# Patient Record
Sex: FEMALE | Race: BLACK OR AFRICAN AMERICAN | NOT HISPANIC OR LATINO | Employment: UNEMPLOYED | URBAN - METROPOLITAN AREA
[De-identification: names, ages, dates, MRNs, and addresses within clinical notes are randomized per-mention and may not be internally consistent; named-entity substitution may affect disease eponyms.]

---

## 2017-02-02 ENCOUNTER — APPOINTMENT (OUTPATIENT)
Dept: GENERAL RADIOLOGY | Facility: CLINIC | Age: 57
End: 2017-02-02
Attending: EMERGENCY MEDICINE
Payer: COMMERCIAL

## 2017-02-02 ENCOUNTER — HOSPITAL ENCOUNTER (EMERGENCY)
Facility: CLINIC | Age: 57
Discharge: HOME | End: 2017-02-02
Attending: EMERGENCY MEDICINE | Admitting: EMERGENCY MEDICINE
Payer: COMMERCIAL

## 2017-02-02 VITALS
WEIGHT: 275 LBS | HEIGHT: 70 IN | TEMPERATURE: 97.4 F | SYSTOLIC BLOOD PRESSURE: 118 MMHG | OXYGEN SATURATION: 99 % | BODY MASS INDEX: 39.37 KG/M2 | DIASTOLIC BLOOD PRESSURE: 67 MMHG | RESPIRATION RATE: 16 BRPM | HEART RATE: 60 BPM

## 2017-02-02 DIAGNOSIS — S22.32XA CLOSED FRACTURE OF ONE RIB OF LEFT SIDE, INITIAL ENCOUNTER: ICD-10-CM

## 2017-02-02 PROCEDURE — 68200002 ZZH TRAUMA EVALUATION W/O CC LEVEL II: Performed by: EMERGENCY MEDICINE

## 2017-02-02 PROCEDURE — 99284 EMERGENCY DEPT VISIT MOD MDM: CPT | Mod: 25 | Performed by: EMERGENCY MEDICINE

## 2017-02-02 PROCEDURE — 76705 ECHO EXAM OF ABDOMEN: CPT | Performed by: EMERGENCY MEDICINE

## 2017-02-02 PROCEDURE — 25000132 ZZH RX MED GY IP 250 OP 250 PS 637: Performed by: EMERGENCY MEDICINE

## 2017-02-02 PROCEDURE — 76705 ECHO EXAM OF ABDOMEN: CPT | Mod: 26 | Performed by: EMERGENCY MEDICINE

## 2017-02-02 PROCEDURE — 71101 X-RAY EXAM UNILAT RIBS/CHEST: CPT | Mod: LT

## 2017-02-02 RX ORDER — ACETAMINOPHEN 500 MG
1000 TABLET ORAL ONCE
Status: COMPLETED | OUTPATIENT
Start: 2017-02-02 | End: 2017-02-02

## 2017-02-02 RX ORDER — HYDROCODONE BITARTRATE AND ACETAMINOPHEN 5; 325 MG/1; MG/1
1-2 TABLET ORAL EVERY 4 HOURS PRN
Qty: 15 TABLET | Refills: 0 | Status: SHIPPED | OUTPATIENT
Start: 2017-02-02 | End: 2022-06-28 | Stop reason: SINTOL

## 2017-02-02 RX ADMIN — ACETAMINOPHEN 1000 MG: 500 TABLET ORAL at 07:37

## 2017-02-02 NOTE — ED PROVIDER NOTES
"  History     Chief Complaint   Patient presents with     Rib Pain     Pt fell in shower     HPI  Alessandra Flynn is a 57 year old female who presents with a fall. 8pm last night she slipped in the shower, landing on the left anterolateral chest. Has discomfort with deep breathing. No blood in urine. No head, neck or back pain. No extremity injuries.   Pt is not anticoagulated.     I have reviewed the Medications, Allergies, Past Medical and Surgical History, and Social History in the [employer] system.  History reviewed. No pertinent past medical history.    Past Surgical History   Procedure Laterality Date     Ankle surgery       Remove hardware ankle Left 11/9/2016     Procedure: REMOVE HARDWARE ANKLE;  Surgeon: Zack Hardy MD;  Location:  OR       No family history on file.    Social History   Substance Use Topics     Smoking status: Never Smoker      Smokeless tobacco: Not on file     Alcohol Use: No       Review of Systems   All systems were reviewed and are negative.       Physical Exam   BP: 128/72 mmHg  Pulse: 60  Temp: 97.4  F (36.3  C)  Resp: 16  Height: 177.8 cm (5' 10\")  Weight: 124.739 kg (275 lb)  SpO2: 99 %    Physical Exam  Gen:A&Ox3, no acute distress  HEENT:PERRL, no facial tenderness or wounds, head atraumatic  Neck:no bony tenderness and full ROM  Back: no CVA tenderness, no midline bony tenderness  CV:RRR without murmurs  PULM:Clear to auscultation bilaterally, tenderness under left breast and lateral lower ribs. No crepitus, no fail movement. No ecchymosis.   Abd:soft, nontender, nondistended. Bowel sounds present and normal  UE:No traumatic injuries, skin normal  LE:no traumatic injuries, skin normal, no LE edema.   Neuro:CN II-XII intact, strength 5/5 throughout  Skin: no rashes or ecchymoses      ED Course     Procedures  Results for orders placed during the hospital encounter of 02/02/17   POC US ABDOMEN LIMITED    Impression Limited Bedside Abdominal Ultrasound, performed and " interpreted  by me.     Indication: left upper adomen, low chest pain. Evaluate for Free  fluid.     The RUQ, LUQ, (including the paracolic gutters) and suprapubic  views were examined for free fluid.    Findings: There was no evidence of free fluid below bilateral  diaphragms, in the splenorenal or hepatorenal space, or in  bilateral paracolic gutters. There was no free fluid around the  urinary bladder.    IMPRESSION: No evidence of abdominal free fluid.             Critical Care time:  none  Trauma:  Level of trauma activation: ED evaluaton  C-collar and immobilization: not indicated, cleared.  CSpine Clearance: by Nexus Criteria  GCS at arrival: 15  GCS at disposition: unchanged  Full Primary and Secondary survey with appropriate immobilization of spine completed in exam section.  Consults prior to admission or transfer: none  Procedures done in the ED: none         Assessments & Plan (with Medical Decision Making)   58 yo F presenting after a slip in fall in shower. Left chest pain.   Vitals stable.   POCUS of the abdomen without free fluid to suggest solid organ injury. Low clinical suspicion for hollow viscus injury.   CXR with left ribs showing possible fracture of anterior 7th rib. This does correlate with pt's area of tenderness.   Will prescribe a small amount of vicodin PRN for pain. Pt given home care information.   Follow up with primary care.     Treated with tylenol for pain x1 in the ED.       I have reviewed the nursing notes.    I have reviewed the findings, diagnosis, plan and need for follow up with the patient.    Discharge Medication List as of 2/2/2017  8:44 AM      START taking these medications    Details   HYDROcodone-acetaminophen (NORCO) 5-325 MG per tablet Take 1-2 tablets by mouth every 4 hours as needed for moderate to severe pain, Disp-15 tablet, R-0, Local Print             Final diagnoses:   Closed fracture of one rib of left side, initial encounter       2/2/2017   [facility], [place],  EMERGENCY DEPARTMENT  MD BARBIE [place] Katrina Anne, MD  02/02/17 0981

## 2017-02-02 NOTE — ED NOTES
Pt was taking a shower around 2000 on 2/1/17, Pt slipped in bathtub and fell on L side of rib cage.

## 2017-02-02 NOTE — ED AVS SNAPSHOT
[facility], Emergency Department    [address]    [facility] [place] [place] [place] [address]    Phone:  [phone]    Fax:  [phone]                                       Alessandra Flynn   MRN: [id number]    Department:  [facility], Emergency Department   Date of Visit:  2/2/2017           Patient Information     Date Of Birth          1960        Your diagnoses for this visit were:     Closed fracture of one rib of left side, initial encounter        You were seen by Yi Shahid MD.      24 Hour Appointment Hotline       To make an appointment at any [facility] clinic, call [phone]). If you don't have a family doctor or clinic, we will help you find one. [facility] clinics are conveniently located to serve the needs of you and your family.             Review of your medicines      START taking        Dose / Directions Last dose taken    HYDROcodone-acetaminophen 5-325 MG per tablet   Commonly known as:  NORCO   Dose:  1-2 tablet   Quantity:  15 tablet        Take 1-2 tablets by mouth every 4 hours as needed for moderate to severe pain   Refills:  0          Our records show that you are taking the medicines listed below. If these are incorrect, please call your family doctor or clinic.        Dose / Directions Last dose taken    ascorbic acid 1000 MG Tabs   Commonly known as:  vitamin C   Dose:  1000 mg        Take 1,000 mg by mouth daily.   Refills:  0        celeXA 20 MG tablet   Dose:  20 mg   Generic drug:  citalopram        Take 20 mg by mouth daily.   Refills:  0        docusate sodium 100 MG capsule   Commonly known as:  COLACE   Dose:  1 capsule        Take 1 capsule by mouth 2 times daily.   Refills:  0        FLUTICASONE PROPIONATE        Refills:  0        hydrOXYzine 25 MG tablet   Commonly known as:  ATARAX   Dose:  25 mg   Quantity:  30 tablet        Take 1 tablet (25 mg) by mouth every 6 hours as needed for itching (and nausea)   Refills:  0        loratadine 10 MG tablet    Commonly known as:  CLARITIN   Dose:  10 mg        Take 10 mg by mouth daily.   Refills:  0        * MAPAP 500 MG Caps   Dose:  1-2 tablet   Generic drug:  acetaminophen        Take 1-2 tablets by mouth as needed.   Refills:  0        * acetaminophen 325 MG tablet   Commonly known as:  TYLENOL   Dose:  650 mg   Quantity:  100 tablet        Take 2 tablets (650 mg) by mouth every 4 hours as needed for other (mild pain)   Refills:  0        omeprazole 20 MG CR capsule   Commonly known as:  priLOSEC   Dose:  1 capsule        Take 1 capsule by mouth 2 times daily.   Refills:  0        ondansetron 4 MG ODT tab   Commonly known as:  ZOFRAN-ODT   Dose:  4-8 mg   Quantity:  4 tablet        Take 1-2 tablets (4-8 mg) by mouth every 8 hours as needed for nausea Dissolve ON the tongue.   Refills:  0        senna-docusate 8.6-50 MG per tablet   Commonly known as:  SENOKOT-S;PERICOLACE   Dose:  1-2 tablet   Quantity:  30 tablet        Take 1-2 tablets by mouth 2 times daily Take while on oral narcotics to prevent or treat constipation.   Refills:  0        simvastatin 40 MG tablet   Commonly known as:  ZOCOR        Take  by mouth At Bedtime.   Refills:  0        * Notice:  This list has 2 medication(s) that are the same as other medications prescribed for you. Read the directions carefully, and ask your doctor or other care provider to review them with you.            Prescriptions were sent or printed at these locations (1 Prescription)                   Other Prescriptions                Printed at Department/Unit printer (1 of 1)         HYDROcodone-acetaminophen (NORCO) 5-325 MG per tablet                Procedures and tests performed during your visit     POC US ABDOMEN LIMITED    Ribs XR, unilat 3 views + PA chest,  left      Orders Needing Specimen Collection     None      Pending Results     Date and Time Order Name Status Description    2/2/2017 0709 POC US ABDOMEN LIMITED In process     2/2/2017 0658 Ribs XR, unilat 3  "views + PA chest,  left Preliminary             Pending Culture Results     No orders found from 2017 to 2/3/2017.            Thank you for choosing [facility]       Thank you for choosing [facility] for your care. Our goal is always to provide you with excellent care. Hearing back from our patients is one way we can continue to improve our services. Please take a few minutes to complete the written survey that you may receive in the mail after you visit with us. Thank you!        [employer] Information     [facility] lets you send messages to your doctor, view your test results, renew your prescriptions, schedule appointments and more. To sign up, go to [email or web address] . Click on \"Log in\" on the left side of the screen, which will take you to the Welcome page. Then click on \"Sign up Now\" on the right side of the page.     You will be asked to enter the access code listed below, as well as some personal information. Please follow the directions to create your username and password.     Your access code is: [email or web address]  Expires: 3/19/2017  6:30 AM     Your access code will  in 90 days. If you need help or a new code, please call your [facility] clinic or [phone].        Care EveryWhere ID     This is your Care EveryWhere ID. This could be used by other organizations to access your [facility] medical records  [id number]        After Visit Summary       This is your record. Keep this with you and show to your community pharmacist(s) and doctor(s) at your next visit.                  "

## 2017-02-02 NOTE — ED AVS SNAPSHOT
[facility], [place], Emergency Department    [address]    [facility] [address]    Phone:  [phone]    Fax:  [phone]                                       Alessandra Flynn   MRN: [id number]    Department:  [facility], Emergency Department   Date of Visit:  2/2/2017           After Visit Summary Signature Page     I have received my discharge instructions, and my questions have been answered. I have discussed any challenges I see with this plan with the nurse or doctor.    ..........................................................................................................................................  Patient/Patient Representative Signature      ..........................................................................................................................................  Patient Representative Print Name and Relationship to Patient    ..................................................               ................................................  Date                                            Time    ..........................................................................................................................................  Reviewed by Signature/Title    ...................................................              ..............................................  Date                                                            Time

## 2018-04-04 ENCOUNTER — HOSPITAL ENCOUNTER (OUTPATIENT)
Dept: MRI IMAGING | Facility: CLINIC | Age: 58
End: 2018-04-04
Attending: FAMILY MEDICINE
Payer: COMMERCIAL

## 2018-04-04 ENCOUNTER — HOSPITAL ENCOUNTER (OUTPATIENT)
Dept: ULTRASOUND IMAGING | Facility: CLINIC | Age: 58
Discharge: HOME | End: 2018-04-04
Attending: FAMILY MEDICINE | Admitting: FAMILY MEDICINE
Payer: COMMERCIAL

## 2018-04-04 DIAGNOSIS — R51.9 RECURRENT HEADACHE: ICD-10-CM

## 2018-04-04 DIAGNOSIS — R10.9 LEFT FLANK PAIN: ICD-10-CM

## 2018-04-04 PROCEDURE — 70551 MRI BRAIN STEM W/O DYE: CPT

## 2018-04-04 PROCEDURE — 76770 US EXAM ABDO BACK WALL COMP: CPT

## 2019-09-04 ENCOUNTER — HOSPITAL ENCOUNTER (OUTPATIENT)
Dept: GENERAL RADIOLOGY | Facility: CLINIC | Age: 59
Discharge: HOME | End: 2019-09-04
Attending: FAMILY MEDICINE | Admitting: FAMILY MEDICINE
Payer: COMMERCIAL

## 2019-09-04 DIAGNOSIS — R76.12 POSITIVE QUANTIFERON-TB GOLD TEST: ICD-10-CM

## 2019-09-04 PROCEDURE — 71046 X-RAY EXAM CHEST 2 VIEWS: CPT

## 2020-01-06 ENCOUNTER — MEDICAL CORRESPONDENCE (OUTPATIENT)
Dept: HEALTH INFORMATION MANAGEMENT | Facility: CLINIC | Age: 60
End: 2020-01-06

## 2020-01-07 ENCOUNTER — ANCILLARY PROCEDURE (OUTPATIENT)
Dept: MAMMOGRAPHY | Facility: CLINIC | Age: 60
End: 2020-01-07
Payer: COMMERCIAL

## 2020-01-07 DIAGNOSIS — N64.4 BREAST PAIN, RIGHT: ICD-10-CM

## 2020-01-21 ENCOUNTER — ANCILLARY PROCEDURE (OUTPATIENT)
Dept: MAMMOGRAPHY | Facility: CLINIC | Age: 60
End: 2020-01-21
Attending: FAMILY MEDICINE
Payer: COMMERCIAL

## 2020-01-21 DIAGNOSIS — N63.0 BREAST MASS: Primary | ICD-10-CM

## 2020-01-21 RX ORDER — LIDOCAINE HYDROCHLORIDE 10 MG/ML
10 INJECTION, SOLUTION EPIDURAL; INFILTRATION; INTRACAUDAL; PERINEURAL ONCE
Status: COMPLETED | OUTPATIENT
Start: 2020-01-21 | End: 2020-01-21

## 2020-01-21 RX ADMIN — LIDOCAINE HYDROCHLORIDE 10 ML: 10 INJECTION, SOLUTION EPIDURAL; INFILTRATION; INTRACAUDAL; PERINEURAL at 11:26

## 2020-01-22 LAB — COPATH REPORT: NORMAL

## 2020-01-23 ENCOUNTER — APPOINTMENT (OUTPATIENT)
Dept: INTERPRETER SERVICES | Facility: CLINIC | Age: 60
End: 2020-01-23
Payer: COMMERCIAL

## 2020-01-23 ENCOUNTER — TELEPHONE (OUTPATIENT)
Dept: MAMMOGRAPHY | Facility: CLINIC | Age: 60
End: 2020-01-23

## 2021-10-08 ENCOUNTER — ANCILLARY PROCEDURE (OUTPATIENT)
Dept: MAMMOGRAPHY | Facility: CLINIC | Age: 61
End: 2021-10-08
Attending: FAMILY MEDICINE
Payer: COMMERCIAL

## 2021-10-08 DIAGNOSIS — Z12.31 VISIT FOR SCREENING MAMMOGRAM: ICD-10-CM

## 2021-10-08 PROCEDURE — 77067 SCR MAMMO BI INCL CAD: CPT | Mod: GC | Performed by: RADIOLOGY

## 2021-11-29 ENCOUNTER — TRANSCRIBE ORDERS (OUTPATIENT)
Dept: OTHER | Age: 61
End: 2021-11-29
Payer: COMMERCIAL

## 2021-11-29 DIAGNOSIS — L81.9 DISCOLORATION OF SKIN OF FACE: Primary | ICD-10-CM

## 2022-02-09 ENCOUNTER — MEDICAL CORRESPONDENCE (OUTPATIENT)
Dept: HEALTH INFORMATION MANAGEMENT | Facility: CLINIC | Age: 62
End: 2022-02-09
Payer: COMMERCIAL

## 2022-02-14 ENCOUNTER — TRANSCRIBE ORDERS (OUTPATIENT)
Dept: OTHER | Age: 62
End: 2022-02-14
Payer: COMMERCIAL

## 2022-02-14 DIAGNOSIS — R19.5 GUAIAC POSITIVE STOOLS: Primary | ICD-10-CM

## 2022-02-17 ENCOUNTER — TRANSCRIBE ORDERS (OUTPATIENT)
Dept: OTHER | Age: 62
End: 2022-02-17
Payer: COMMERCIAL

## 2022-02-17 DIAGNOSIS — R19.5 GUAIAC POSITIVE STOOLS: Primary | ICD-10-CM

## 2022-02-17 DIAGNOSIS — K21.00 GASTROESOPHAGEAL REFLUX DISEASE WITH ESOPHAGITIS, UNSPECIFIED WHETHER HEMORRHAGE: ICD-10-CM

## 2022-03-10 ENCOUNTER — OFFICE VISIT (OUTPATIENT)
Dept: DERMATOLOGY | Facility: CLINIC | Age: 62
End: 2022-03-10
Attending: NURSE PRACTITIONER
Payer: COMMERCIAL

## 2022-03-10 DIAGNOSIS — L81.0 POST-INFLAMMATORY HYPERPIGMENTATION: ICD-10-CM

## 2022-03-10 DIAGNOSIS — L20.9 ATOPIC DERMATITIS, UNSPECIFIED TYPE: Primary | ICD-10-CM

## 2022-03-10 PROCEDURE — T1013 SIGN LANG/ORAL INTERPRETER: HCPCS | Mod: U3

## 2022-03-10 PROCEDURE — 99202 OFFICE O/P NEW SF 15 MIN: CPT | Performed by: PHYSICIAN ASSISTANT

## 2022-03-10 RX ORDER — HYDROQUINONE 40 MG/G
CREAM TOPICAL
Qty: 30 G | Refills: 1 | Status: SHIPPED | OUTPATIENT
Start: 2022-03-10

## 2022-03-10 RX ORDER — HYDROQUINONE 40 MG/G
CREAM TOPICAL
Qty: 30 G | Refills: 1 | Status: SHIPPED | OUTPATIENT
Start: 2022-03-10 | End: 2022-03-14

## 2022-03-10 RX ORDER — HYDROCORTISONE 25 MG/G
CREAM TOPICAL
Qty: 30 G | Refills: 1 | Status: SHIPPED | OUTPATIENT
Start: 2022-03-10

## 2022-03-10 NOTE — NURSING NOTE
Dermatology Rooming Note    Alessandra Flynn's goals for this visit include:   Chief Complaint   Patient presents with     Derm Problem     Discoloration of skin on face     Ellen Dahl, CMA

## 2022-03-10 NOTE — PROGRESS NOTES
[facility] Dermatology Note  Encounter Date: Mar 10, 2022  Office Visit     Dermatology Problem List:  1. Atopic dermatitis with associated PIH  - hydrocortisone 2.5% cream, hydroquinone 4% cream  ____________________________________________    Assessment & Plan:    # Atopic dermatitis with associated PIH, patient also has seasonal rhinitis. Did react strongly to an OTC cleanser on the face, unsure of what it was but it caused excessive facial dryness, redness and scaling. This has since resolved but the PIH has remained.    - Continue moisturizing with Vaseline as she can tolerate this well without reactions  - Start applying hydrocortisone 2.5 % cream BID PRN for itchy rashes on the face. No rash currently, so patient to hold rx unless she gets another reaction on the face  - Start applying hydroquinone 4% cream to the face nightly for 12 weeks. If not improved after 12 weeks, discontinue use. If hyperpigmentation worsens, discontinue use. The patient is not pregnant or breast feeding.     Procedures Performed:   None    Follow-up: 3-4 month(s) in-person, or earlier for new or changing lesions    Staff and Scribe:     Scribe Disclosure:  I, Laura Servin, am serving as a scribe to document services personally performed by Marcella Sosa PA-C based on data collection and the provider's statements to me.     Provider Disclosure:   The documentation recorded by the scribe accurately reflects the services I personally performed and the decisions made by me.    All risks, benefits and alternatives were discussed with patient.  Patient is in agreement and understands the assessment and plan.  All questions were answered.    Marcella Sosa PA-C, [facility]  [facility] [place]: Phone: [phone], Fax: [phone]  [facility]: Phone: [phone],  Fax: [phone]  [facility]: Phone: [phone],  Fax: [phone]  ____________________________________________    CC: Derm Problem (Discoloration of skin on face)    HPI:  Ms. Alessandra Flynn is a(n) 62 year old female who presents today as a new patient for discoloration of the face. Patient states that this has been present for about 5 years now. She states that this started after developing itchy, dry skin on her face that then turned into dark spots. She is continuing to notice itchy skin though. She always applies Vaseline after washing her face. She tried using an OTC face wash that made her face red and hot. She has since stopped using this. She endorses seasonal allergies but denies a history of eczema. Occasionally she gets full body itching, thought not currently. She takes Claritin daily and hydroxyzine at night.     Patient is otherwise feeling well, without additional skin concerns. This visit was assisted by a Koko .     Labs Reviewed:  N/A    Physical Exam:  Vitals: There were no vitals taken for this visit.  SKIN: Focused examination of the face was performed.  - Patchy dark brown hyperpigmentation of the forehead and cheeks. No dryness or scaling.   - No other lesions of concern on areas examined.     Medications:  Current Outpatient Medications   Medication     hydrOXYzine (ATARAX) 25 MG tablet     loratadine (CLARITIN) 10 MG tablet     acetaminophen (MAPAP) 500 MG CAPS     acetaminophen (TYLENOL) 325 MG tablet     ascorbic acid (VITAMIN C) 1000 MG TABS     citalopram (CELEXA) 20 MG tablet     docusate sodium (COLACE) 100 MG capsule     FLUTICASONE PROPIONATE     HYDROcodone-acetaminophen (NORCO) 5-325 MG per tablet     omeprazole (PRILOSEC) 20 MG capsule     ondansetron (ZOFRAN-ODT) 4 MG disintegrating tablet     senna-docusate (SENOKOT-S;PERICOLACE) 8.6-50 MG per tablet     simvastatin (ZOCOR) 40 MG tablet     No current facility-administered medications for this visit.     Facility-Administered Medications Ordered in Other  Visits   Medication     lidocaine (PF) (XYLOCAINE) 1 % injection 10 mL      Past Medical History:   Patient Active Problem List   Diagnosis     Edema     Primary localized osteoarthrosis, ankle and foot     Chondromalacia of patella     No past medical history on file.     CC MARY Bell [facility]  [address] [place] [address] on close of this encounter.

## 2022-03-14 RX ORDER — HYDROQUINONE 40 MG/G
CREAM TOPICAL
Qty: 28.35 G | Refills: 1 | Status: SHIPPED | OUTPATIENT
Start: 2022-03-14

## 2022-03-14 NOTE — TELEPHONE ENCOUNTER
HYDROQUINONE 4% CREAM      Last Written Prescription Date:  3/10/22  Last Fill Quantity: 30 g,   # refills: 1  Last Office Visit : 3/10/22  Future Office visit:  7/15/22    Routing refill request to provider for review/approval because:  Per pharmacy note:  Alternative Requested:HYDROQUINONE IS NOT COVERED BY PT'S INSURANCE. PLEASE CONTACT PT'S INSURANCE FOR PRIOR AUTH OR CHANGE TO ALTERNATIVE.

## 2022-05-14 ENCOUNTER — HOSPITAL ENCOUNTER (EMERGENCY)
Facility: CLINIC | Age: 62
Discharge: HOME | End: 2022-05-14
Attending: EMERGENCY MEDICINE | Admitting: EMERGENCY MEDICINE
Payer: COMMERCIAL

## 2022-05-14 VITALS
SYSTOLIC BLOOD PRESSURE: 152 MMHG | OXYGEN SATURATION: 96 % | DIASTOLIC BLOOD PRESSURE: 91 MMHG | RESPIRATION RATE: 16 BRPM | TEMPERATURE: 98.3 F | HEART RATE: 61 BPM

## 2022-05-14 DIAGNOSIS — L29.9 ITCHING: ICD-10-CM

## 2022-05-14 PROCEDURE — 99283 EMERGENCY DEPT VISIT LOW MDM: CPT | Performed by: EMERGENCY MEDICINE

## 2022-05-14 PROCEDURE — 99282 EMERGENCY DEPT VISIT SF MDM: CPT | Performed by: EMERGENCY MEDICINE

## 2022-05-14 RX ORDER — CETIRIZINE HYDROCHLORIDE 10 MG/1
10 TABLET ORAL DAILY
COMMUNITY
Start: 2022-04-02

## 2022-05-14 NOTE — ED PROVIDER NOTES
[facility] EMERGENCY DEPARTMENT ([facility])    5/14/22     ED 7      History     Chief Complaint   Patient presents with     Pruritis     Itching, all over body- started 3 days ago     The history is provided by the patient and medical records.     Alessandra Flynn is a 62 year old female who presents with diffuse body itches. She has been taking skin whitening cream and has had some erythema, skin irritation and pruritis intermittently, especially over the past 3-4 days. She states she saw her primary care doctor at the [facility] who started her on a different cream as well as hydrocortisone. However symptoms have not improved so she presents to the Emergency Department for evaluation. She denies any other symptoms. No shortness of breath. No facial or tongue swelling. No nausea or vomiting.     Patient has an allergy to eggs.    Past Medical History  History reviewed. No pertinent past medical history.  Past Surgical History:   Procedure Laterality Date     ANKLE SURGERY       REMOVE HARDWARE ANKLE Left 11/9/2016    Procedure: REMOVE HARDWARE ANKLE;  Surgeon: Zack Hardy MD;  [facility]: [facility] OR     acetaminophen (TYLENOL) 325 MG tablet  acetaminophen 500 MG CAPS  ascorbic acid (VITAMIN C) 1000 MG TABS  cetirizine (ZYRTEC) 10 MG tablet  citalopram (CELEXA) 20 MG tablet  diphenhydrAMINE (BENADRYL) 2 % external cream  docusate sodium (COLACE) 100 MG capsule  FLUTICASONE PROPIONATE  HYDROcodone-acetaminophen (NORCO) 5-325 MG per tablet  hydrocortisone, Perianal, (HYDROCORTISONE) 2.5 % cream  hydroquinone (DAJUAN) 4 % external cream  hydroquinone (DAJUAN) 4 % external cream  hydrOXYzine (ATARAX) 25 MG tablet  loratadine (CLARITIN) 10 MG tablet  omeprazole (PRILOSEC) 20 MG capsule  ondansetron (ZOFRAN-ODT) 4 MG disintegrating tablet  senna-docusate (SENOKOT-S;PERICOLACE) 8.6-50 MG per tablet  simvastatin (ZOCOR) 40 MG tablet      Allergies   Allergen Reactions     Chicken-Derived  Products (Egg)      Family History  History reviewed. No pertinent family history.  Social History   Social History     Tobacco Use     Smoking status: Never Smoker     Smokeless tobacco: Never Used   Substance Use Topics     Alcohol use: No     Drug use: No      Past medical history, past surgical history, medications, allergies, family history, and social history were reviewed with the patient. No additional pertinent items.       Review of Systems   Constitutional: Negative for fever.   Respiratory: Negative for shortness of breath.    Cardiovascular: Negative for chest pain.   Gastrointestinal: Negative for abdominal pain.   Skin: Positive for rash.   All other systems reviewed and are negative.    A complete review of systems was performed with pertinent positives and negatives noted in the HPI, and all other systems negative.    Physical Exam   BP: (!) 163/96  Pulse: 55  Temp: 98.3  F (36.8  C)  Resp: 18  SpO2: 95 %  Physical Exam  Vitals and nursing note reviewed.   Constitutional:       General: She is not in acute distress.     Appearance: She is not diaphoretic.   HENT:      Head: Atraumatic.      Mouth/Throat:      Pharynx: No oropharyngeal exudate.   Eyes:      General: No scleral icterus.     Pupils: Pupils are equal, round, and reactive to light.   Cardiovascular:      Rate and Rhythm: Normal rate and regular rhythm.      Heart sounds: Normal heart sounds.   Pulmonary:      Effort: No respiratory distress.      Breath sounds: Normal breath sounds.   Abdominal:      General: Bowel sounds are normal.      Palpations: Abdomen is soft.      Tenderness: There is no abdominal tenderness.   Musculoskeletal:         General: No tenderness.   Skin:     General: Skin is warm.      Findings: No rash.   Neurological:      General: No focal deficit present.      Mental Status: She is oriented to person, place, and time.           ED Course      Procedures                 No results found for any visits on  05/14/22.  Medications - No data to display     Assessments & Plan (with Medical Decision Making)     62 year old female who presents with diffuse body itches. She has been taking skin whitening cream and has had some erythema, skin irritation and pruritis intermittently, especially over the past 3-4 days. Conversation with the patient via  regarding contact dermatitis and need to abstain from using cosmetic creams disorder.  Patient given a prescription for Benadryl cream and advised to follow-up with primary care provider within a week to 10 days as needed.    I have reviewed the nursing notes. I have reviewed the findings, diagnosis, plan and need for follow up with the patient.    New Prescriptions    No medications on file       Final diagnoses:   Itching   I, Juliette Elise, am serving as a trained medical scribe to document services personally performed by Henrietta Wyman MD based on the provider's statements to me on May 14, 2022.  This document has been checked and approved by the attending provider.    I, Henrietta Wyman MD, was physically present and have reviewed and verified the accuracy of this note documented by Juliette Elise, medical scribe.        --  Henrietta Wyman MD  [facility] EMERGENCY DEPARTMENT  5/14/2022     Henrietta Wyman MD  05/15/22 1017

## 2022-05-15 ASSESSMENT — ENCOUNTER SYMPTOMS
SHORTNESS OF BREATH: 0
FEVER: 0
ABDOMINAL PAIN: 0

## 2022-06-23 ENCOUNTER — TELEPHONE (OUTPATIENT)
Dept: DERMATOLOGY | Facility: CLINIC | Age: 62
End: 2022-06-23

## 2022-06-23 NOTE — TELEPHONE ENCOUNTER
Left message with  about her appt 7/15. Her appt has been cancelled and a letter has been mailed out.

## 2022-06-28 ENCOUNTER — HOSPITAL ENCOUNTER (EMERGENCY)
Facility: CLINIC | Age: 62
Discharge: HOME | End: 2022-06-28
Attending: EMERGENCY MEDICINE | Admitting: EMERGENCY MEDICINE
Payer: COMMERCIAL

## 2022-06-28 ENCOUNTER — APPOINTMENT (OUTPATIENT)
Dept: GENERAL RADIOLOGY | Facility: CLINIC | Age: 62
End: 2022-06-28
Attending: EMERGENCY MEDICINE
Payer: COMMERCIAL

## 2022-06-28 ENCOUNTER — APPOINTMENT (OUTPATIENT)
Dept: INTERPRETER SERVICES | Facility: CLINIC | Age: 62
End: 2022-06-28
Payer: COMMERCIAL

## 2022-06-28 VITALS
DIASTOLIC BLOOD PRESSURE: 93 MMHG | SYSTOLIC BLOOD PRESSURE: 149 MMHG | HEART RATE: 81 BPM | OXYGEN SATURATION: 97 % | TEMPERATURE: 99.5 F | RESPIRATION RATE: 16 BRPM

## 2022-06-28 DIAGNOSIS — U07.1 INFECTION DUE TO 2019 NOVEL CORONAVIRUS: ICD-10-CM

## 2022-06-28 LAB
ALBUMIN SERPL-MCNC: 2.7 G/DL (ref 3.4–5)
ALP SERPL-CCNC: 87 U/L (ref 40–150)
ALT SERPL W P-5'-P-CCNC: 14 U/L (ref 0–50)
ANION GAP SERPL CALCULATED.3IONS-SCNC: 6 MMOL/L (ref 3–14)
AST SERPL W P-5'-P-CCNC: 16 U/L (ref 0–45)
BASOPHILS # BLD AUTO: 0 10E3/UL (ref 0–0.2)
BASOPHILS NFR BLD AUTO: 1 %
BILIRUB SERPL-MCNC: 0.3 MG/DL (ref 0.2–1.3)
BUN SERPL-MCNC: 7 MG/DL (ref 7–30)
CALCIUM SERPL-MCNC: 9.2 MG/DL (ref 8.5–10.1)
CHLORIDE BLD-SCNC: 107 MMOL/L (ref 94–109)
CO2 SERPL-SCNC: 26 MMOL/L (ref 20–32)
CREAT SERPL-MCNC: 0.58 MG/DL (ref 0.52–1.04)
EOSINOPHIL # BLD AUTO: 0.2 10E3/UL (ref 0–0.7)
EOSINOPHIL NFR BLD AUTO: 4 %
ERYTHROCYTE [DISTWIDTH] IN BLOOD BY AUTOMATED COUNT: 14.7 % (ref 10–15)
GFR SERPL CREATININE-BSD FRML MDRD: >90 ML/MIN/1.73M2
GLUCOSE BLD-MCNC: 92 MG/DL (ref 70–99)
HCT VFR BLD AUTO: 38.1 % (ref 35–47)
HGB BLD-MCNC: 12.4 G/DL (ref 11.7–15.7)
HOLD SPECIMEN: NORMAL
HOLD SPECIMEN: NORMAL
IMM GRANULOCYTES # BLD: 0 10E3/UL
IMM GRANULOCYTES NFR BLD: 1 %
LYMPHOCYTES # BLD AUTO: 0.4 10E3/UL (ref 0.8–5.3)
LYMPHOCYTES NFR BLD AUTO: 7 %
MCH RBC QN AUTO: 29.5 PG (ref 26.5–33)
MCHC RBC AUTO-ENTMCNC: 32.5 G/DL (ref 31.5–36.5)
MCV RBC AUTO: 91 FL (ref 78–100)
MONOCYTES # BLD AUTO: 0.4 10E3/UL (ref 0–1.3)
MONOCYTES NFR BLD AUTO: 7 %
NEUTROPHILS # BLD AUTO: 4.9 10E3/UL (ref 1.6–8.3)
NEUTROPHILS NFR BLD AUTO: 80 %
NRBC # BLD AUTO: 0 10E3/UL
NRBC BLD AUTO-RTO: 0 /100
PLATELET # BLD AUTO: 247 10E3/UL (ref 150–450)
POTASSIUM BLD-SCNC: 3.7 MMOL/L (ref 3.4–5.3)
PROCALCITONIN SERPL-MCNC: <0.05 NG/ML
PROT SERPL-MCNC: 7.6 G/DL (ref 6.8–8.8)
RBC # BLD AUTO: 4.2 10E6/UL (ref 3.8–5.2)
SARS-COV-2 RNA RESP QL NAA+PROBE: POSITIVE
SODIUM SERPL-SCNC: 139 MMOL/L (ref 133–144)
WBC # BLD AUTO: 6 10E3/UL (ref 4–11)

## 2022-06-28 PROCEDURE — 99284 EMERGENCY DEPT VISIT MOD MDM: CPT | Mod: CS,25 | Performed by: EMERGENCY MEDICINE

## 2022-06-28 PROCEDURE — C9803 HOPD COVID-19 SPEC COLLECT: HCPCS | Performed by: EMERGENCY MEDICINE

## 2022-06-28 PROCEDURE — 85025 COMPLETE CBC W/AUTO DIFF WBC: CPT | Performed by: EMERGENCY MEDICINE

## 2022-06-28 PROCEDURE — 99283 EMERGENCY DEPT VISIT LOW MDM: CPT | Mod: CS | Performed by: EMERGENCY MEDICINE

## 2022-06-28 PROCEDURE — 82947 ASSAY GLUCOSE BLOOD QUANT: CPT | Performed by: EMERGENCY MEDICINE

## 2022-06-28 PROCEDURE — 82374 ASSAY BLOOD CARBON DIOXIDE: CPT | Performed by: EMERGENCY MEDICINE

## 2022-06-28 PROCEDURE — 84155 ASSAY OF PROTEIN SERUM: CPT | Performed by: EMERGENCY MEDICINE

## 2022-06-28 PROCEDURE — 84145 PROCALCITONIN (PCT): CPT | Performed by: EMERGENCY MEDICINE

## 2022-06-28 PROCEDURE — 71046 X-RAY EXAM CHEST 2 VIEWS: CPT

## 2022-06-28 PROCEDURE — U0003 INFECTIOUS AGENT DETECTION BY NUCLEIC ACID (DNA OR RNA); SEVERE ACUTE RESPIRATORY SYNDROME CORONAVIRUS 2 (SARS-COV-2) (CORONAVIRUS DISEASE [COVID-19]), AMPLIFIED PROBE TECHNIQUE, MAKING USE OF HIGH THROUGHPUT TECHNOLOGIES AS DESCRIBED BY CMS-2020-01-R: HCPCS | Performed by: EMERGENCY MEDICINE

## 2022-06-28 PROCEDURE — 250N000013 HC RX MED GY IP 250 OP 250 PS 637: Performed by: EMERGENCY MEDICINE

## 2022-06-28 PROCEDURE — 36415 COLL VENOUS BLD VENIPUNCTURE: CPT | Performed by: EMERGENCY MEDICINE

## 2022-06-28 RX ADMIN — ACETAMINOPHEN AND CODEINE PHOSPHATE 1 TABLET: 300; 30 TABLET ORAL at 11:40

## 2022-06-28 ASSESSMENT — ENCOUNTER SYMPTOMS
VOMITING: 0
CHILLS: 1
NAUSEA: 0
COUGH: 1
FEVER: 1
FATIGUE: 1
SHORTNESS OF BREATH: 0
ABDOMINAL PAIN: 0

## 2022-06-28 NOTE — ED TRIAGE NOTES
Pt comes to evaluation of cough and fever for 3 days. Dry cough. Denies being around covid patients. Allergy meds; cough meds and pain meds not helping from OTC.      Triage Assessment     Row Name 06/28/22 0957       Triage Assessment (Adult)    Airway WDL WDL       Respiratory WDL    Respiratory WDL WDL       Skin Circulation/Temperature WDL    Skin Circulation/Temperature WDL WDL       Cardiac WDL    Cardiac WDL WDL       Peripheral/Neurovascular WDL    Peripheral Neurovascular WDL WDL       Cognitive/Neuro/Behavioral WDL    Cognitive/Neuro/Behavioral WDL WDL

## 2022-06-28 NOTE — DISCHARGE INSTRUCTIONS
Do not take the cholesterol medication while you are on the covid medication.     Do not take the prilosec while on the covid medication.     Return to the ER if your breathing worsens.

## 2022-06-28 NOTE — ED PROVIDER NOTES
ED Provider Note  [facility]      History     Chief Complaint   Patient presents with     Fever     Cough     X3 days     HPI  Alessandra Flynn is a 62 year old female with a history of hypertension and high cholesterol who presents to the ER complaining of cough x3 days.  Patient says that she has been coughing more at night.  She denies any shortness of breath but says that when she is coughing a lot she has difficulty catching her breath.  She says that she had a subjective fever last night.  The cough is not productive.  Denies any chest pain.  Says that nobody else in her house is sick.  She is vaccinated against COVID.    Past Medical History  No past medical history on file.  Past Surgical History:   Procedure Laterality Date     ANKLE SURGERY       REMOVE HARDWARE ANKLE Left 11/9/2016    Procedure: REMOVE HARDWARE ANKLE;  Surgeon: Zack Hardy MD;  Location:  OR     acetaminophen 500 MG CAPS  nirmatrelvir and ritonavir (PAXLOVID) therapy pack  omeprazole (PRILOSEC) 20 MG capsule  ascorbic acid (VITAMIN C) 1000 MG TABS  cetirizine (ZYRTEC) 10 MG tablet  citalopram (CELEXA) 20 MG tablet  diphenhydrAMINE (BENADRYL) 2 % external cream  docusate sodium (COLACE) 100 MG capsule  FLUTICASONE PROPIONATE  hydrocortisone, Perianal, (HYDROCORTISONE) 2.5 % cream  hydroquinone (DAJUAN) 4 % external cream  hydroquinone (DAJUAN) 4 % external cream  hydrOXYzine (ATARAX) 25 MG tablet  loratadine (CLARITIN) 10 MG tablet  ondansetron (ZOFRAN-ODT) 4 MG disintegrating tablet  senna-docusate (SENOKOT-S;PERICOLACE) 8.6-50 MG per tablet      Allergies   Allergen Reactions     Chicken-Derived Products (Egg)      Family History  No family history on file.  Social History   Social History     Tobacco Use     Smoking status: Never Smoker     Smokeless tobacco: Never Used   Substance Use Topics     Alcohol use: No     Drug use: No      Past medical history, past surgical history, medications,  allergies, family history, and social history were reviewed with the patient. No additional pertinent items.       Review of Systems   Constitutional: Positive for chills, fatigue and fever.   Respiratory: Positive for cough. Negative for shortness of breath.    Cardiovascular: Negative for chest pain.   Gastrointestinal: Negative for abdominal pain, nausea and vomiting.   All other systems reviewed and are negative.    A complete review of systems was performed with pertinent positives and negatives noted in the HPI, and all other systems negative.    Physical Exam   BP: (!) 149/93  Pulse: 84  Temp: 99.5  F (37.5  C)  Resp: 16  SpO2: 95 %  Physical Exam  Constitutional:       General: She is not in acute distress.     Appearance: She is well-developed. She is not ill-appearing, toxic-appearing or diaphoretic.   HENT:      Head: Normocephalic and atraumatic.   Cardiovascular:      Rate and Rhythm: Normal rate and regular rhythm.      Heart sounds: Normal heart sounds.   Pulmonary:      Effort: Pulmonary effort is normal. No respiratory distress.      Breath sounds: Normal breath sounds. No stridor.   Abdominal:      General: There is no distension.      Palpations: Abdomen is soft.      Tenderness: There is no abdominal tenderness. There is no rebound.   Musculoskeletal:         General: No swelling or tenderness.      Cervical back: Normal range of motion.   Skin:     General: Skin is warm and dry.   Neurological:      General: No focal deficit present.      Mental Status: She is alert and oriented to person, place, and time.      Cranial Nerves: No cranial nerve deficit.      Sensory: No sensory deficit.      Motor: No weakness.   Psychiatric:         Mood and Affect: Mood normal.         Behavior: Behavior normal.         Thought Content: Thought content normal.         ED Course      Procedures       The medical record was reviewed and interpreted.  Current labs reviewed and interpreted.  Previous labs reviewed  and interpreted.              Results for orders placed or performed during the hospital encounter of 06/28/22   XR Chest 2 Views     Status: None (Preliminary result)    Narrative    CHEST TWO VIEW   6/28/2022 11:21 AM     HISTORY: Cough, fever.    COMPARISON: Chest x-ray 9/4/2019.      Impression    IMPRESSION: No acute cardiopulmonary disease.   Comprehensive metabolic panel     Status: Abnormal   Result Value Ref Range    Sodium 139 133 - 144 mmol/L    Potassium 3.7 3.4 - 5.3 mmol/L    Chloride 107 94 - 109 mmol/L    Carbon Dioxide (CO2) 26 20 - 32 mmol/L    Anion Gap 6 3 - 14 mmol/L    Urea Nitrogen 7 7 - 30 mg/dL    Creatinine 0.58 0.52 - 1.04 mg/dL    Calcium 9.2 8.5 - 10.1 mg/dL    Glucose 92 70 - 99 mg/dL    Alkaline Phosphatase 87 40 - 150 U/L    AST 16 0 - 45 U/L    ALT 14 0 - 50 U/L    Protein Total 7.6 6.8 - 8.8 g/dL    Albumin 2.7 (L) 3.4 - 5.0 g/dL    Bilirubin Total 0.3 0.2 - 1.3 mg/dL    GFR Estimate >90 >60 mL/min/1.73m2   Procalcitonin     Status: Normal   Result Value Ref Range    Procalcitonin <0.05 <0.05 ng/mL   Asymptomatic COVID-19 Virus (Coronavirus) by PCR Nasopharyngeal     Status: Abnormal    Specimen: Nasopharyngeal; Swab   Result Value Ref Range    SARS CoV2 PCR Positive (A) Negative    Narrative    Testing was performed using the leti  SARS-CoV-2 & Influenza A/B Assay on the leti  Raquel  System.  This test should be ordered for the detection of SARS-COV-2 in individuals who meet SARS-CoV-2 clinical and/or epidemiological criteria. Test performance is unknown in asymptomatic patients.  This test is for in vitro diagnostic use under the FDA EUA for laboratories certified under CLIA to perform moderate and/or high complexity testing. This test has not been FDA cleared or approved.  A negative test does not rule out the presence of PCR inhibitors in the specimen or target RNA in concentration below the limit of detection for the assay. The possibility of a false negative should be  considered if the patient's recent exposure or clinical presentation suggests COVID-19.  [facility] are certified under the Clinical Laboratory Improvement Amendments of 1988 (CLIA-88) as qualified to perform moderate and/or high complexity laboratory testing.   CBC with platelets and differential     Status: Abnormal   Result Value Ref Range    WBC Count 6.0 4.0 - 11.0 10e3/uL    RBC Count 4.20 3.80 - 5.20 10e6/uL    Hemoglobin 12.4 11.7 - 15.7 g/dL    Hematocrit 38.1 35.0 - 47.0 %    MCV 91 78 - 100 fL    MCH 29.5 26.5 - 33.0 pg    MCHC 32.5 31.5 - 36.5 g/dL    RDW 14.7 10.0 - 15.0 %    Platelet Count 247 150 - 450 10e3/uL    % Neutrophils 80 %    % Lymphocytes 7 %    % Monocytes 7 %    % Eosinophils 4 %    % Basophils 1 %    % Immature Granulocytes 1 %    NRBCs per 100 WBC 0 <1 /100    Absolute Neutrophils 4.9 1.6 - 8.3 10e3/uL    Absolute Lymphocytes 0.4 (L) 0.8 - 5.3 10e3/uL    Absolute Monocytes 0.4 0.0 - 1.3 10e3/uL    Absolute Eosinophils 0.2 0.0 - 0.7 10e3/uL    Absolute Basophils 0.0 0.0 - 0.2 10e3/uL    Absolute Immature Granulocytes 0.0 <=0.4 10e3/uL    Absolute NRBCs 0.0 10e3/uL   Extra Tube     Status: None    Narrative    The following orders were created for panel order Extra Tube.  Procedure                               Abnormality         Status                     ---------                               -----------         ------                     Extra Blue Top Tube[[id number]]                              Final result               Extra Red Top Tube[[id number]]                               Final result                 Please view results for these tests on the individual orders.   Extra Blue Top Tube     Status: None   Result Value Ref Range    Hold Specimen JIC    Extra Red Top Tube     Status: None   Result Value Ref Range    Hold Specimen JIC    CBC with platelets differential     Status: Abnormal    Narrative    The following orders were created for panel order CBC  with platelets differential.  Procedure                               Abnormality         Status                     ---------                               -----------         ------                     CBC with platelets and d...[[id number]]  Abnormal            Final result                 Please view results for these tests on the individual orders.     Medications   acetaminophen-codeine (TYLENOL #3) 300-30 MG per tablet 1 tablet (1 tablet Oral Given 6/28/22 1140)        Assessments & Plan (with Medical Decision Making)   Patient is a very nice 62-year-old female who presented to the ER due to 3 days of ongoing cough.  Patient had a normal chest x-ray.  Patient's oxygenation was 100% on room air.  Patient was in no respiratory distress.  Patient was found to be COVID-19 positive.  This is likely the cause of her symptoms.  Patient was offered a Paxlovid which she is in agreement in taking.  Patient does meet criteria based on age hypertension and being a high risk minority.  Patient will be discharged home with medications.    I have reviewed the nursing notes. I have reviewed the findings, diagnosis, plan and need for follow up with the patient.    Discharge Medication List as of 6/28/2022 12:36 PM      START taking these medications    Details   nirmatrelvir and ritonavir (PAXLOVID) therapy pack Take 3 tablets by mouth 2 times daily for 5 days Take 2 Nirmatrelvir tablets and 1 Ritonavir tablet twice daily for 5 days., Disp-30 each, R-0, E-PrescribeIf Paxlovid unavailable and no Molnupiravir ordered, refer patient to MNRAP at [email or web address]. If Molnupiravir ordered along with Paxlovid, dispense Molnupiravir and review embryotoxicity.             Final diagnoses:   Infection due to 2019 novel coronavirus       --    [facility] EMERGENCY DEPARTMENT  6/28/2022     Gin Henley MD  06/28/22 [id number]

## 2022-09-08 ENCOUNTER — OFFICE VISIT (OUTPATIENT)
Dept: INTERNAL MEDICINE | Facility: CLINIC | Age: 62
End: 2022-09-08
Payer: COMMERCIAL

## 2022-09-08 VITALS
HEART RATE: 85 BPM | DIASTOLIC BLOOD PRESSURE: 78 MMHG | WEIGHT: 145.9 LBS | RESPIRATION RATE: 20 BRPM | TEMPERATURE: 98.7 F | SYSTOLIC BLOOD PRESSURE: 131 MMHG | HEIGHT: 64 IN | BODY MASS INDEX: 24.91 KG/M2 | OXYGEN SATURATION: 100 %

## 2022-09-08 DIAGNOSIS — M25.561 CHRONIC PAIN OF RIGHT KNEE: ICD-10-CM

## 2022-09-08 DIAGNOSIS — G89.29 CHRONIC PAIN OF LEFT KNEE: ICD-10-CM

## 2022-09-08 DIAGNOSIS — M25.512 CHRONIC LEFT SHOULDER PAIN: ICD-10-CM

## 2022-09-08 DIAGNOSIS — M25.562 CHRONIC PAIN OF LEFT KNEE: ICD-10-CM

## 2022-09-08 DIAGNOSIS — Z00.00 ROUTINE GENERAL MEDICAL EXAMINATION AT A HEALTH CARE FACILITY: Primary | ICD-10-CM
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
## 2022-09-08 DIAGNOSIS — L98.9 SKIN LESION: ICD-10-CM

## 2022-09-08 DIAGNOSIS — K59.00 CONSTIPATION, UNSPECIFIED CONSTIPATION TYPE: ICD-10-CM

## 2022-09-08 DIAGNOSIS — H57.89 EYE IRRITATION: ICD-10-CM

## 2022-09-08 DIAGNOSIS — G89.29 CHRONIC PAIN OF RIGHT KNEE: ICD-10-CM

## 2022-09-08 DIAGNOSIS — G89.29 CHRONIC LEFT SHOULDER PAIN: ICD-10-CM

## 2022-09-08 DIAGNOSIS — M79.10 MUSCLE PAIN: ICD-10-CM

## 2022-09-08 PROCEDURE — 99386 PREV VISIT NEW AGE 40-64: CPT

## 2022-09-08 PROCEDURE — 99214 OFFICE O/P EST MOD 30 MIN: CPT | Mod: 25

## 2022-09-08 RX ORDER — OLOPATADINE HYDROCHLORIDE 1 MG/ML
1 SOLUTION/ DROPS OPHTHALMIC 2 TIMES DAILY
Qty: 5 ML | Refills: 1 | Status: SHIPPED | OUTPATIENT
Start: 2022-09-08 | End: 2022-10-26

## 2022-09-08 RX ORDER — POLYETHYLENE GLYCOL 3350 17 G/17G
1 POWDER, FOR SOLUTION ORAL DAILY
Qty: 289 G | Refills: 1 | Status: SHIPPED | OUTPATIENT
Start: 2022-09-08 | End: 2022-10-26

## 2022-09-08 RX ORDER — CYCLOBENZAPRINE HCL 5 MG
5 TABLET ORAL AT BEDTIME
Qty: 30 TABLET | Refills: 0 | Status: SHIPPED | OUTPATIENT
Start: 2022-09-08 | End: 2022-10-26

## 2022-09-08 ASSESSMENT — ENCOUNTER SYMPTOMS
HEMATURIA: 0
COUGH: 0
PARESTHESIAS: 0
DYSURIA: 0
SORE THROAT: 0
HEADACHES: 1
NERVOUS/ANXIOUS: 1
EYE PAIN: 0
HEARTBURN: 1
FREQUENCY: 0
SHORTNESS OF BREATH: 0
CHILLS: 0
JOINT SWELLING: 0
NAUSEA: 0
CONSTIPATION: 1
DIARRHEA: 0
FEVER: 0
DIZZINESS: 0
ABDOMINAL PAIN: 0
PALPITATIONS: 0
WEAKNESS: 1
MYALGIAS: 1
ARTHRALGIAS: 0
BREAST MASS: 0
HEMATOCHEZIA: 0

## 2022-09-08 NOTE — PATIENT INSTRUCTIONS
Miralax for constipation. Drink much water    Pataday or refresh eye drops for more comfort in eyes.    Ice, tylenol and ibuprofen for pain in shoulder and knees          Preventive Health Recommendations  Female Ages 50 - 64    Yearly exam: See your health care provider every year in order to  Review health changes.   Discuss preventive care.    Review your medicines if your doctor has prescribed any.    Get a Pap test every three years (unless you have an abnormal result and your provider advises testing more often).  If you get Pap tests with HPV test, you only need to test every 5 years, unless you have an abnormal result.   You do not need a Pap test if your uterus was removed (hysterectomy) and you have not had cancer.  You should be tested each year for STDs (sexually transmitted diseases) if you're at risk.   Have a mammogram every 1 to 2 years.  Have a colonoscopy at age 50, or have a yearly FIT test (stool test). These exams screen for colon cancer.    Have a cholesterol test every 5 years, or more often if advised.  Have a diabetes test (fasting glucose) every three years. If you are at risk for diabetes, you should have this test more often.   If you are at risk for osteoporosis (brittle bone disease), think about having a bone density scan (DEXA).    Shots: Get a flu shot each year. Get a tetanus shot every 10 years.    Nutrition:   Eat at least 5 servings of fruits and vegetables each day.  Eat whole-grain bread, whole-wheat pasta and brown rice instead of white grains and rice.  Get adequate Calcium and Vitamin D.     Lifestyle  Exercise at least 150 minutes a week (30 minutes a day, 5 days a week). This will help you control your weight and prevent disease.  Limit alcohol to one drink per day.  No smoking.   Wear sunscreen to prevent skin cancer.   See your dentist every six months for an exam and cleaning.  See your eye doctor every 1 to 2 years.

## 2022-09-08 NOTE — PROGRESS NOTES
SUBJECTIVE:   CC: Lucia Beltre is an 62 year old woman who presents for preventive health visit.     A phone  was used to used to complete visit today. Her sone was present in the room    Patient has been advised of split billing requirements and indicates understanding: Yes     Healthy Habits:     Getting at least 3 servings of Calcium per day:  Yes    Bi-annual eye exam:  Yes    Dental care twice a year:  Yes    Sleep apnea or symptoms of sleep apnea:  None    Diet:  Low salt and Vegetarian/vegan    Frequency of exercise:  None    Taking medications regularly:  Yes    Medication side effects:  Muscle aches and Other    PHQ-2 Total Score: 0    Additional concerns today:  No    Today's PHQ-2 Score:   PHQ-2 ( 1999 Pfizer) 9/8/2022   Q1: Little interest or pleasure in doing things 0   Q2: Feeling down, depressed or hopeless 0   PHQ-2 Score 0   Q1: Little interest or pleasure in doing things Not at all   Q2: Feeling down, depressed or hopeless Not at all   PHQ-2 Score 0     Abuse: Current or Past (Physical, Sexual or Emotional) - No  Do you feel safe in your environment? Yes  Social History     Tobacco Use     Smoking status: Never Smoker     Smokeless tobacco: Never Used   Substance Use Topics     Alcohol use: Never     If you drink alcohol do you typically have >3 drinks per day or >7 drinks per week? No    Alcohol Use 9/8/2022   Prescreen: >3 drinks/day or >7 drinks/week? No   No flowsheet data found.    Reviewed orders with patient.  Reviewed health maintenance and updated orders accordingly - Yes    Breast Cancer Screening:    Breast CA Risk Assessment (FHS-7) 9/8/2022   Do you have a family history of breast, colon, or ovarian cancer? No / Unknown         Mammogram Screening: Recommended mammography every 1-2 years with patient discussion and risk factor consideration  Pertinent mammograms are reviewed under the imaging tab.    History of abnormal Pap smear: NO - age 30-65 PAP every 5 years with  "negative HPV co-testing recommended     Reviewed and updated as needed this visit by clinical staff   Tobacco  Allergies  Meds   Med Hx  Surg Hx  Fam Hx  Soc Hx          Reviewed and updated as needed this visit by Provider                   No significant PMH per patient     Review of Systems   Constitutional: Negative for chills and fever.   HENT: Positive for ear pain. Negative for congestion, hearing loss and sore throat.    Eyes: Positive for visual disturbance. Negative for pain.   Respiratory: Negative for cough and shortness of breath.    Cardiovascular: Negative for chest pain, palpitations and peripheral edema.   Gastrointestinal: Positive for constipation and heartburn. Negative for abdominal pain, diarrhea, hematochezia and nausea.   Breasts:  Negative for tenderness, breast mass and discharge.   Genitourinary: Negative for dysuria, frequency, genital sores, hematuria, pelvic pain, urgency, vaginal bleeding and vaginal discharge.   Musculoskeletal: Positive for myalgias. Negative for arthralgias and joint swelling.   Skin: Negative for rash.   Neurological: Positive for weakness and headaches. Negative for dizziness and paresthesias.   Psychiatric/Behavioral: Negative for mood changes. The patient is nervous/anxious.      Pain in knee left  Onset: \"a long time\"  Location: Bilateral  Characteristics: ache  Aggravating/relieving: nothing makes it worse or better  Radiation: none  Timing: worse when sitting for a long time gets worse when walking and standing.   Muscles in shoulder around neck for along time since before she was traveling.     Shoulder  Onset: \"for a long time\" got worse with fall  Location: Left shoulder  Duration: constant- worse with raising hand above head  Characteristics: ache  Aggravating/relieving: none  Radiation: down arm into deltoid  Timing: constant  Fell on knees and shoulder which made pain worse. Time of fall was end of last month  Tylenol and ibuprofen can help " "pain    Head- protruding on head and feels like a big rash on top of head- looks like a mole of some sore bilateral temporal area. No pain associated with them. They have been there for about two years but have been getting bigger.     Eye problem- tears flowing down eyes, and burning in eyes  Onset: 2 years   Denies vision change    Stomach pain as well- feels constipation- denies stomach ache. Does not know how long it has been going on. Patient has BM once every two weeks which she says is normal for her. She has been having this problem for last 4 months.  Denies blood in stool.    Weakness tiredness and headaches- headaches come and go and are relieved with OTC medication.    Declines mammogram and colonoscopy      OBJECTIVE:   /78 (BP Location: Right arm, Patient Position: Sitting, Cuff Size: Adult Large)   Pulse 85   Temp 98.7  F (37.1  C) (Tympanic)   Resp 20   Ht 1.626 m (5' 4\")   Wt 66.2 kg (145 lb 14.4 oz)   LMP  (LMP Unknown)   SpO2 100%   BMI 25.04 kg/m    Physical Exam  GENERAL: alert and no distress  EYES: Eyes grossly normal to inspection, PERRL and conjunctivae and sclerae normal  HENT: ear canals and TM's normal, nose and mouth without ulcers or lesions  NECK: no adenopathy, no asymmetry, masses, or scars and thyroid normal to palpation  RESP: lungs clear to auscultation - no rales, rhonchi or wheezes  CV: regular rate and rhythm, normal S1 S2, no S3 or S4, no murmur, click or rub, no peripheral edema and peripheral pulses strong  ABDOMEN: soft, nontender, no hepatosplenomegaly, no masses and bowel sounds normal  MS: no gross musculoskeletal defects noted, no edema  SKIN: no suspicious lesions or rashes, pain with ROM of L shoulder above head,  NEURO: Normal strength and tone, mentation intact and speech normal  PSYCH: mentation appears normal, affect normal/bright    ASSESSMENT/PLAN:   (Z00.00) Routine general medical examination at a health care facility  (primary encounter " diagnosis)  Comment: Patient has not seen provider in years. Here today with additional problems  Plan: Comprehensive metabolic panel (BMP + Alb, Alk         Phos, ALT, AST, Total. Bili, TP), CBC with         platelets and differential, TSH with free T4         reflex            (M25.562,  G89.29) Chronic pain of left knee  Comment: Bilateral chronic knee pain, tylenol and ibuprofen do help. Patient also fell on knees at end of last month. Will get a XR to make sure there is no fracture  Plan: Orthopedic  Referral            (M25.561,  G89.29) Chronic pain of right knee  Comment: Refer to ortho  Plan: Orthopedic  Referral    (L98.9) Skin lesion  Comment: on temporal area of head bilaterally. Appears to be some type of mole but with odd color and it has been getting bigger  Plan: Adult Dermatology Referral            (M25.512,  G89.29) Chronic left shoulder pain  Comment: pain in shoulder  Plan: Orthopedic  Referral            (W19.XXXA) Fall, initial encounter  Comment: fall on knees and shoulder. Will XR to make sure there is no traumatic injury  Plan: CANCELED: XR Knee Bilateral 1/2 Views,         CANCELED: XR Shoulder Left 2 Views            (M79.10) Muscle pain  Comment: tightness/sore in shoulders/neck. Will recommend flexeril and bedtime for a month to see if that can relax muscles  Plan: cyclobenzaprine (FLEXERIL) 5 MG tablet            (K59.00) Constipation, unspecified constipation type  Comment: Patient has infrequent bowel movements about every two weeks which is change that started 4 months ago. I asked patient to do colonoscopy but she declined. I recommend taking miralax daily to soften stool to make bowel movement more regular  Plan: polyethylene glycol (MIRALAX) 17 GM/Dose powder            (H57.89) Eye irritation  Comment: ongoing for two years. Takes a eye drop that she was prescibed a long time ago but is unsure of the name. I will try patanol twice a day  Plan:  "olopatadine (PATANOL) 0.1 % ophthalmic solutio      Patient declines mammogram and colonoscopy    45 minutes was spent with patient counseling and addressing problems face to face.      COUNSELING:  Reviewed preventive health counseling, as reflected in patient instructions    Estimated body mass index is 25.04 kg/m  as calculated from the following:    Height as of this encounter: 1.626 m (5' 4\").    Weight as of this encounter: 66.2 kg (145 lb 14.4 oz).      She reports that she has never smoked. She has never used smokeless tobacco.      Counseling Resources:  ATP IV Guidelines  Pooled Cohorts Equation Calculator  Breast Cancer Risk Calculator  BRCA-Related Cancer Risk Assessment: FHS-7 Tool  FRAX Risk Assessment  ICSI Preventive Guidelines  Dietary Guidelines for Americans, 2010  USDA's MyPlate  ASA Prophylaxis  Lung CA Screening    Ruiz Henderson NP  [facility]  "

## 2022-10-22 ENCOUNTER — HOSPITAL ENCOUNTER (EMERGENCY)
Facility: CLINIC | Age: 62
End: 2022-10-22
Payer: COMMERCIAL

## 2022-10-26 ENCOUNTER — OFFICE VISIT (OUTPATIENT)
Dept: INTERNAL MEDICINE | Facility: CLINIC | Age: 62
End: 2022-10-26
Payer: COMMERCIAL

## 2022-10-26 VITALS
OXYGEN SATURATION: 98 % | BODY MASS INDEX: 25.52 KG/M2 | TEMPERATURE: 98.3 F | SYSTOLIC BLOOD PRESSURE: 119 MMHG | RESPIRATION RATE: 22 BRPM | WEIGHT: 149.5 LBS | HEIGHT: 64 IN | HEART RATE: 89 BPM | DIASTOLIC BLOOD PRESSURE: 74 MMHG

## 2022-10-26 DIAGNOSIS — L72.3 SEBACEOUS CYST: Primary | ICD-10-CM

## 2022-10-26 PROCEDURE — 99213 OFFICE O/P EST LOW 20 MIN: CPT | Performed by: NURSE PRACTITIONER

## 2022-10-26 NOTE — PROGRESS NOTES
"  Assessment & Plan     Sebaceous cyst        Patient Instructions   Selsun shampoo, avoid oil to scalp, no scratching scalp                     BMI:   Estimated body mass index is 25.66 kg/m  as calculated from the following:    Height as of this encounter: 1.626 m (5' 4\").    Weight as of this encounter: 67.8 kg (149 lb 8 oz).           Lisa Allison NP  [facility] TAPAN Myers is a 62 year old accompanied by her son, presenting for the following health issues:  Hair/Scalp Problem (Pimples on head for two years. It does not hurt but it itches and does not know what is causing it.)      HPI     Concern - scalp cysts  Onset: 2 years  Description: cysts on scalp, no psin, geet red and develop pus when she scratches them  Intensity: mild  Progression of Symptoms:  waxing and waning  Accompanying Signs & Symptoms: none  Previous history of similar problem: no  Precipitating factors:        Worsened by: none  Alleviating factors:        Improved by: none  Therapies tried and outcome: washes hair every other day, used oil on hair/scalp, no chemicals or dyes        Review of Systems   Constitutional, HEENT, cardiovascular, pulmonary, gi and gu systems are negative, except as otherwise noted.      Objective    LMP  (LMP Unknown)   /74 (BP Location: Right arm, Patient Position: Sitting, Cuff Size: Adult Regular)   Pulse 89   Temp 98.3  F (36.8  C) (Tympanic)   Resp 22   Ht 1.626 m (5' 4\")   Wt 67.8 kg (149 lb 8 oz)   LMP  (LMP Unknown)   SpO2 98%   BMI 25.66 kg/m      Physical Exam   GENERAL: healthy, alert and no distress  SKIN: scalp dry, no flaking, scattered sebacous cysts <1 cm no folliculitis or pustules                    "

## 2022-12-13 ENCOUNTER — OFFICE VISIT (OUTPATIENT)
Dept: INTERNAL MEDICINE | Facility: CLINIC | Age: 62
End: 2022-12-13
Payer: COMMERCIAL

## 2022-12-13 VITALS
SYSTOLIC BLOOD PRESSURE: 132 MMHG | TEMPERATURE: 96.6 F | HEART RATE: 85 BPM | RESPIRATION RATE: 20 BRPM | BODY MASS INDEX: 25.42 KG/M2 | HEIGHT: 64 IN | OXYGEN SATURATION: 100 % | DIASTOLIC BLOOD PRESSURE: 74 MMHG | WEIGHT: 148.9 LBS

## 2022-12-13 DIAGNOSIS — K21.9 GASTROESOPHAGEAL REFLUX DISEASE, UNSPECIFIED WHETHER ESOPHAGITIS PRESENT: Primary | ICD-10-CM

## 2022-12-13 PROCEDURE — 99213 OFFICE O/P EST LOW 20 MIN: CPT | Performed by: INTERNAL MEDICINE

## 2022-12-13 ASSESSMENT — PAIN SCALES - GENERAL: PAINLEVEL: NO PAIN (0)

## 2022-12-13 NOTE — PROGRESS NOTES
Assessment & Plan     (K21.9) Gastroesophageal reflux disease, unspecified whether esophagitis present  (primary encounter diagnosis)  Plan: Discussed the etiology of GERD with patient.  Started on omeprazole (PRILOSEC) 20 MG DR capsule daily as directed.explained clearly about the medication,insructions and side effects.  Discussed raising the head of the bed 4 to 6 inches; avoiding chocolate, coffee, peppermint, fruit juices, NSAID's, tomatoes, greasy and spicy foods. F/u with PCP  prn if these symtoms worsen, fail to improve as anticipated, or if new symptoms develop.      Prescription drug management   [id number]}     Return in about 2 months (around 2/13/2023) for PCP Santiago Melchor .    Samantha Gil MD  [facility]    Julio Myers is a 62 year old, presenting for the following health issues along with her daughter ( pt of Santiago Melchor)  Patient is being seen to discuss having gas and heartburn for sometime.    History of Present Illness       Reason for visit:  Gas and heartburn    She eats 2-3 servings of fruits and vegetables daily.She consumes 2 sweetened beverage(s) daily.She exercises with enough effort to increase her heart rate 9 or less minutes per day.  She exercises with enough effort to increase her heart rate 3 or less days per week.   She is taking medications regularly.     Pt is a 62 year old female who is seen here to day with c/o heartburn and gas since many yrs. and has increased lately recently as patient has been out of medication, patient called complaints of fall increased burping and belching, heartburn and acid reflux.  No nausea or vomiting.  Patient was on medication in the past but stopped when she went to [place] and has not been on any medications for 1-1/2 years.  No alcohol or smoking use, occasionally drinks coffee.  Currently not taking any medications       No past medical history on file.    Current Outpatient Medications  "  Medication Sig Dispense Refill     omeprazole (PRILOSEC) 20 MG DR capsule Take 1 capsule (20 mg) by mouth daily 90 capsule 0        Review of Systems   CONSTITUTIONAL: NEGATIVE for fever, chills, change in weight  RESP: NEGATIVE for significant cough or SOB  CV: NEGATIVE for chest pain, palpitations or peripheral edema  GI: heartburn or reflux      Objective    /74   Pulse 85   Temp (!) 96.6  F (35.9  C) (Tympanic)   Resp 20   Ht 1.626 m (5' 4\")   Wt 67.5 kg (148 lb 14.4 oz)   LMP  (LMP Unknown)   SpO2 100%   Breastfeeding No   BMI 25.56 kg/m    Body mass index is 25.56 kg/m .  Physical Exam   GENERAL: healthy, alert and no distress  RESP: lungs clear to auscultation - no rales, rhonchi or wheezes  CV: regular rate and rhythm, normal S1 S2,    ABDOMEN: soft, nontender,  and bowel sounds normal       "

## 2023-01-11 ENCOUNTER — HOSPITAL ENCOUNTER (EMERGENCY)
Facility: CLINIC | Age: 63
Discharge: HOME | End: 2023-01-11
Attending: EMERGENCY MEDICINE | Admitting: EMERGENCY MEDICINE
Payer: COMMERCIAL

## 2023-01-11 ENCOUNTER — APPOINTMENT (OUTPATIENT)
Dept: GENERAL RADIOLOGY | Facility: CLINIC | Age: 63
End: 2023-01-11
Attending: EMERGENCY MEDICINE
Payer: COMMERCIAL

## 2023-01-11 VITALS
TEMPERATURE: 97.7 F | RESPIRATION RATE: 18 BRPM | HEART RATE: 75 BPM | DIASTOLIC BLOOD PRESSURE: 83 MMHG | SYSTOLIC BLOOD PRESSURE: 127 MMHG | OXYGEN SATURATION: 99 %

## 2023-01-11 DIAGNOSIS — J10.1 INFLUENZA A: ICD-10-CM

## 2023-01-11 DIAGNOSIS — J18.9 PNEUMONIA OF BOTH LUNGS DUE TO INFECTIOUS ORGANISM, UNSPECIFIED PART OF LUNG: ICD-10-CM

## 2023-01-11 LAB
ALBUMIN SERPL BCG-MCNC: 4.2 G/DL (ref 3.5–5.2)
ALP SERPL-CCNC: 91 U/L (ref 35–104)
ALT SERPL W P-5'-P-CCNC: 34 U/L (ref 10–35)
ANION GAP SERPL CALCULATED.3IONS-SCNC: 14 MMOL/L (ref 7–15)
AST SERPL W P-5'-P-CCNC: 32 U/L (ref 10–35)
ATRIAL RATE - MUSE: 73 BPM
BASOPHILS # BLD AUTO: 0 10E3/UL (ref 0–0.2)
BASOPHILS NFR BLD AUTO: 0 %
BILIRUB SERPL-MCNC: 0.3 MG/DL
BUN SERPL-MCNC: 11.6 MG/DL (ref 8–23)
CALCIUM SERPL-MCNC: 10.1 MG/DL (ref 8.8–10.2)
CHLORIDE SERPL-SCNC: 99 MMOL/L (ref 98–107)
CREAT SERPL-MCNC: 0.58 MG/DL (ref 0.51–0.95)
DEPRECATED HCO3 PLAS-SCNC: 26 MMOL/L (ref 22–29)
DIASTOLIC BLOOD PRESSURE - MUSE: NORMAL MMHG
EOSINOPHIL # BLD AUTO: 0.1 10E3/UL (ref 0–0.7)
EOSINOPHIL NFR BLD AUTO: 2 %
ERYTHROCYTE [DISTWIDTH] IN BLOOD BY AUTOMATED COUNT: 14 % (ref 10–15)
FLUAV RNA SPEC QL NAA+PROBE: POSITIVE
FLUBV RNA RESP QL NAA+PROBE: NEGATIVE
GFR SERPL CREATININE-BSD FRML MDRD: >90 ML/MIN/1.73M2
GLUCOSE SERPL-MCNC: 112 MG/DL (ref 70–99)
HCT VFR BLD AUTO: 45.5 % (ref 35–47)
HGB BLD-MCNC: 14.3 G/DL (ref 11.7–15.7)
HOLD SPECIMEN: NORMAL
HOLD SPECIMEN: NORMAL
IMM GRANULOCYTES # BLD: 0 10E3/UL
IMM GRANULOCYTES NFR BLD: 0 %
INTERPRETATION ECG - MUSE: NORMAL
LYMPHOCYTES # BLD AUTO: 2.5 10E3/UL (ref 0.8–5.3)
LYMPHOCYTES NFR BLD AUTO: 53 %
MCH RBC QN AUTO: 27.4 PG (ref 26.5–33)
MCHC RBC AUTO-ENTMCNC: 31.4 G/DL (ref 31.5–36.5)
MCV RBC AUTO: 87 FL (ref 78–100)
MONOCYTES # BLD AUTO: 0.3 10E3/UL (ref 0–1.3)
MONOCYTES NFR BLD AUTO: 7 %
NEUTROPHILS # BLD AUTO: 1.8 10E3/UL (ref 1.6–8.3)
NEUTROPHILS NFR BLD AUTO: 38 %
NRBC # BLD AUTO: 0 10E3/UL
NRBC BLD AUTO-RTO: 0 /100
P AXIS - MUSE: 36 DEGREES
PLATELET # BLD AUTO: 245 10E3/UL (ref 150–450)
POTASSIUM SERPL-SCNC: 3.5 MMOL/L (ref 3.4–5.3)
PR INTERVAL - MUSE: 156 MS
PROT SERPL-MCNC: 7.9 G/DL (ref 6.4–8.3)
QRS DURATION - MUSE: 68 MS
QT - MUSE: 398 MS
QTC - MUSE: 438 MS
R AXIS - MUSE: -4 DEGREES
RBC # BLD AUTO: 5.21 10E6/UL (ref 3.8–5.2)
RSV RNA SPEC NAA+PROBE: NEGATIVE
SARS-COV-2 RNA RESP QL NAA+PROBE: NEGATIVE
SODIUM SERPL-SCNC: 139 MMOL/L (ref 136–145)
SYSTOLIC BLOOD PRESSURE - MUSE: NORMAL MMHG
T AXIS - MUSE: 20 DEGREES
TROPONIN T SERPL HS-MCNC: <6 NG/L
VENTRICULAR RATE- MUSE: 73 BPM
WBC # BLD AUTO: 4.7 10E3/UL (ref 4–11)

## 2023-01-11 PROCEDURE — 84484 ASSAY OF TROPONIN QUANT: CPT | Performed by: PHYSICIAN ASSISTANT

## 2023-01-11 PROCEDURE — 250N000011 HC RX IP 250 OP 636: Performed by: EMERGENCY MEDICINE

## 2023-01-11 PROCEDURE — 96374 THER/PROPH/DIAG INJ IV PUSH: CPT

## 2023-01-11 PROCEDURE — 80053 COMPREHEN METABOLIC PANEL: CPT | Performed by: EMERGENCY MEDICINE

## 2023-01-11 PROCEDURE — 96361 HYDRATE IV INFUSION ADD-ON: CPT

## 2023-01-11 PROCEDURE — 99285 EMERGENCY DEPT VISIT HI MDM: CPT | Mod: CS,25

## 2023-01-11 PROCEDURE — 85025 COMPLETE CBC W/AUTO DIFF WBC: CPT | Performed by: EMERGENCY MEDICINE

## 2023-01-11 PROCEDURE — 258N000003 HC RX IP 258 OP 636: Performed by: EMERGENCY MEDICINE

## 2023-01-11 PROCEDURE — C9803 HOPD COVID-19 SPEC COLLECT: HCPCS

## 2023-01-11 PROCEDURE — 87637 SARSCOV2&INF A&B&RSV AMP PRB: CPT | Performed by: EMERGENCY MEDICINE

## 2023-01-11 PROCEDURE — 93005 ELECTROCARDIOGRAM TRACING: CPT

## 2023-01-11 PROCEDURE — 36415 COLL VENOUS BLD VENIPUNCTURE: CPT | Performed by: EMERGENCY MEDICINE

## 2023-01-11 PROCEDURE — 71046 X-RAY EXAM CHEST 2 VIEWS: CPT

## 2023-01-11 RX ORDER — DOXYCYCLINE 100 MG/1
100 CAPSULE ORAL 2 TIMES DAILY
Qty: 10 CAPSULE | Refills: 0 | Status: SHIPPED | OUTPATIENT
Start: 2023-01-11 | End: 2023-01-16

## 2023-01-11 RX ORDER — ONDANSETRON 4 MG/1
4 TABLET, ORALLY DISINTEGRATING ORAL EVERY 8 HOURS PRN
Qty: 10 TABLET | Refills: 0 | Status: SHIPPED | OUTPATIENT
Start: 2023-01-11 | End: 2023-01-14

## 2023-01-11 RX ORDER — ONDANSETRON 2 MG/ML
4 INJECTION INTRAMUSCULAR; INTRAVENOUS ONCE
Status: COMPLETED | OUTPATIENT
Start: 2023-01-11 | End: 2023-01-11

## 2023-01-11 RX ADMIN — SODIUM CHLORIDE 1000 ML: 9 INJECTION, SOLUTION INTRAVENOUS at 08:51

## 2023-01-11 RX ADMIN — ONDANSETRON 4 MG: 2 INJECTION INTRAMUSCULAR; INTRAVENOUS at 08:52

## 2023-01-11 ASSESSMENT — ENCOUNTER SYMPTOMS
MYALGIAS: 1
COUGH: 1
NAUSEA: 1
FEVER: 1

## 2023-01-11 ASSESSMENT — ACTIVITIES OF DAILY LIVING (ADL): ADLS_ACUITY_SCORE: 33

## 2023-01-11 NOTE — ED TRIAGE NOTES
Cough, nausea, and generalized weakness x1 week. Pt unable to ambulate due to weakness, per family report. Denies chest pain or shortness of breath     Triage Assessment     Row Name 01/11/23 0837       Triage Assessment (Adult)    Airway WDL WDL       Cognitive/Neuro/Behavioral WDL    Cognitive/Neuro/Behavioral WDL WDL

## 2023-01-11 NOTE — ED PROVIDER NOTES
Emergency Department Attending Supervision Note  1/11/2023  12:50 PM      I evaluated this patient in conjunction with Divina Joshua PA-C      Briefly, the patient presented with cold symptoms and generalized weakness.  Patient presenting with cough, nausea, and generalized weakness for the past 1 week. Initially improved, though now worsening.  Son had similar symptoms.  This has resulted in generalized weakness making it difficult to ambulate per family.  She has noted some discomfort to her anterior chest, though this is present only with coughing.  She has a history of underlying gastroesophageal reflux though denies any other medical problems.      On my exam:  Awake, well-hydrated, resting comfortably.  Lungs clear to auscultation bilaterally  Regular rate and rhythm, S1/S2 present, no murmurs gallops or rubs  Tenderness to palpation over anterior chest wall, exacerbates chest discomfort  Abdomen soft, nontender, non-distended  Moves all extremities equally  Gait stable    Results:  Labs Ordered and Resulted from Time of ED Arrival to Time of ED Departure   COMPREHENSIVE METABOLIC PANEL - Abnormal       Result Value    Sodium 139      Potassium 3.5      Chloride 99      Carbon Dioxide (CO2) 26      Anion Gap 14      Urea Nitrogen 11.6      Creatinine 0.58      Calcium 10.1      Glucose 112 (*)     Alkaline Phosphatase 91      AST 32      ALT 34      Protein Total 7.9      Albumin 4.2      Bilirubin Total 0.3      GFR Estimate >90     INFLUENZA A/B & SARS-COV2 PCR MULTIPLEX - Abnormal    Influenza A PCR Positive (*)     Influenza B PCR Negative      RSV PCR Negative      SARS CoV2 PCR Negative     CBC WITH PLATELETS AND DIFFERENTIAL - Abnormal    WBC Count 4.7      RBC Count 5.21 (*)     Hemoglobin 14.3      Hematocrit 45.5      MCV 87      MCH 27.4      MCHC 31.4 (*)     RDW 14.0      Platelet Count 245      % Neutrophils 38      % Lymphocytes 53      % Monocytes 7      % Eosinophils 2      % Basophils 0       % Immature Granulocytes 0      NRBCs per 100 WBC 0      Absolute Neutrophils 1.8      Absolute Lymphocytes 2.5      Absolute Monocytes 0.3      Absolute Eosinophils 0.1      Absolute Basophils 0.0      Absolute Immature Granulocytes 0.0      Absolute NRBCs 0.0     TROPONIN T, HIGH SENSITIVITY - Normal    Troponin T, High Sensitivity <6       Chest XR,  PA & LAT   Final Result   IMPRESSION: Ill-defined opacities in both mid and lower lungs, may be   related to infection or edema. Shallow inspiration accentuates heart   size and pulmonary vascularity. No pleural effusions. No pneumothorax.      DAVIDSON PEDERSEN MD            SYSTEM ID:  [id number]        ED course:    My impression is influenza a and concern for superimposed bacterial pneumonia.  VS on presentation unremarkable.  Influenza a positive.  Given duration of symptoms, patient be on window for which Tamiflu is felt to pose greater risk of harm than of benefit.  She has no history of underlying pulmonary, cardiac, or renal disease.  Chest x-ray was obtained demonstrating ill-defined opacities to the mid and lower lungs that may relocked infection.  Given the patient's symptom complex, we are inclined to treat presumptively for pneumonia.  In the absence of comorbid disease, advanced age, or recent hospitalization, will initiate doxycycline.  She notes symptoms of chest discomfort which is likely musculoskeletal in the setting of recurrent coughing.  This is reproducible with palpation.  EKG demonstrates sinus rhythm with nonspecific ST-T wave changes though high-sensitivity troponin has returned normal.  Her laboratory evaluation as noted above is otherwise unremarkable.  She is ambulatory in the ED without hypoxia and work of breathing unremarkable.  With reasonable clinical certainty I feel she can safely be discharged and plan for outpatient follow-up.  Return precautions reviewed including worsening shortness of breath, fatigue, falls, or any other  concerns.  Questions answered prior to discharge.    Diagnosis    ICD-10-CM    1. Influenza A  J10.1       2. Pneumonia of both lungs due to infectious organism, unspecified part of lung  J18.9             Evaristo Rhodes MD Roach, Brian Donald, MD  01/11/23 1712

## 2023-01-11 NOTE — ED PROVIDER NOTES
History     Chief Complaint:  Cold Symptoms and Generalized Weakness  Interpretation used: Finnish    HPI   Lucia Beltre is a 63 year old female who presents with fevers, cough, nausea, and body aches. Patient states her symptoms started last week and then she improved. Symptoms returned this week.  Patient's son was sick with similar symptoms. Patient was reduced appetite but continues to drink water and use bathroom regularly. She took single dose of ibuprofen last night. Patient endorsing productive cough with intermittent nausea. No vomiting or rash. No influenza vaccination this year. Son at bedside.    Independent Historian: Patient and son at bedside     ROS:  Review of Systems   Constitutional: Positive for fever.   Respiratory: Positive for cough.    Gastrointestinal: Positive for nausea.   Musculoskeletal: Positive for myalgias.   All other systems reviewed and are negative.      Allergies:  No Known Allergies     Medications:    doxycycline hyclate (VIBRAMYCIN) 100 MG capsule  ondansetron (ZOFRAN ODT) 4 MG ODT tab  omeprazole (PRILOSEC) 20 MG DR capsule        Past Medical History:    History reviewed. No pertinent past medical history.    Past Surgical History:    History reviewed. No pertinent surgical history.     Family History:    family history is not on file.    Social History:   reports that she has never smoked. She has never been exposed to tobacco smoke. She has never used smokeless tobacco. She reports that she does not drink alcohol and does not use drugs.  PCP: Ruiz Henderson     Physical Exam     Patient Vitals for the past 24 hrs:   BP Temp Temp src Pulse Resp SpO2   01/11/23 1344 127/83 -- -- 75 -- 99 %   01/11/23 0838 126/84 97.7  F (36.5  C) Oral 76 18 95 %        Physical Exam  Constitutional: Alert, attentive, GCS 15  HENT:    Nose: Nasal congestion.   Mouth/Throat: Oropharynx is clear, mucous membranes are moist   Eyes: EOM are normal.   CV: regular rate and rhythm; no murmurs,  rubs or gallups.  Chest: Effort normal and breath sounds normal. Productive cough. No wheezing or stridor.  GI:  There is no tenderness. No distension. Normal bowel sounds  MSK: Normal range of motion.   Neurological: Alert, attentive  Skin: Skin is warm and dry.      Emergency Department Course   ECG:  ECG results from 01/11/23   EKG 12 lead     Value    Systolic Blood Pressure     Diastolic Blood Pressure     Ventricular Rate 73    Atrial Rate 73    NJ Interval 156    QRS Duration 68        QTc 438    P Axis 36    R AXIS -4    T Axis 20    Interpretation ECG      Sinus rhythm  Nonspecific T wave abnormality  Abnormal ECG  No previous ECGs available  Confirmed by - EMERGENCY ROOM, PHYSICIAN ([id number]),  JUAQUIN KRAUSE ([id number]) on 1/11/2023 12:24:13 PM         Imaging:  Chest XR,  PA & LAT   Final Result   IMPRESSION: Ill-defined opacities in both mid and lower lungs, may be   related to infection or edema. Shallow inspiration accentuates heart   size and pulmonary vascularity. No pleural effusions. No pneumothorax.      DAVIDSON PEDERSEN MD            SYSTEM ID:  [id number]         Report per radiology    Laboratory:  Labs Ordered and Resulted from Time of ED Arrival to Time of ED Departure   COMPREHENSIVE METABOLIC PANEL - Abnormal       Result Value    Sodium 139      Potassium 3.5      Chloride 99      Carbon Dioxide (CO2) 26      Anion Gap 14      Urea Nitrogen 11.6      Creatinine 0.58      Calcium 10.1      Glucose 112 (*)     Alkaline Phosphatase 91      AST 32      ALT 34      Protein Total 7.9      Albumin 4.2      Bilirubin Total 0.3      GFR Estimate >90     INFLUENZA A/B & SARS-COV2 PCR MULTIPLEX - Abnormal    Influenza A PCR Positive (*)     Influenza B PCR Negative      RSV PCR Negative      SARS CoV2 PCR Negative     CBC WITH PLATELETS AND DIFFERENTIAL - Abnormal    WBC Count 4.7      RBC Count 5.21 (*)     Hemoglobin 14.3      Hematocrit 45.5      MCV 87      MCH 27.4      MCHC 31.4 (*)      RDW 14.0      Platelet Count 245      % Neutrophils 38      % Lymphocytes 53      % Monocytes 7      % Eosinophils 2      % Basophils 0      % Immature Granulocytes 0      NRBCs per 100 WBC 0      Absolute Neutrophils 1.8      Absolute Lymphocytes 2.5      Absolute Monocytes 0.3      Absolute Eosinophils 0.1      Absolute Basophils 0.0      Absolute Immature Granulocytes 0.0      Absolute NRBCs 0.0     TROPONIN T, HIGH SENSITIVITY - Normal    Troponin T, High Sensitivity <6          Emergency Department Course & Assessments:    Interventions:  Medications   0.9% sodium chloride BOLUS (0 mLs Intravenous Stopped 1/11/23 1240)   ondansetron (ZOFRAN) injection 4 mg (4 mg Intravenous Given 1/11/23 8410)        Independent Interpretation (X-rays, CTs, rhythm strip):  Chest xray, EKG    Consultations/Discussion of Management or Tests:  Supervision under Dr. Rhodes    Disposition:  The patient was discharged to home.     Impression & Plan    CMS Diagnoses: None    Medical Decision Making:  Patient is a pleasant 62 yo F who presents for one week history of fevers, cough, nausea, and body aches. She is in no acute respiratory distress. Vital signs appropriate, afebrile, nonhypoxic. Physical examination reveals productive cough otherwise benign cardiac and pulmonary examination. Differential includes viral URI, ACS, pneumonia.  Preliminary labs reassuring. Viral swab positive for influenza A. CXR demonstrates bilateral mid and lower lungs.Troponin within normal limits. Findings suggestive of pneumonia. Patient assessed by ED Physician Dr. Rhodes who recommends road test and antibiotics for suspected pneumonia.   Results reviewed and discussed with patient. She was able to ambulate and maintain oxygen above 97%. Patient will be sent home with oral antibiotic for suspected secondary pneumonia and supportive cares are recommended including rest and Tylenol for fevers or body aches. Return precautions to ED discussed including  persistent vomiting or difficulty breathing. Patient expressed understanding of plan and was ready for discharge.    Diagnosis:    ICD-10-CM    1. Influenza A  J10.1       2. Pneumonia of both lungs due to infectious organism, unspecified part of lung  J18.9            Discharge Medications:  New Prescriptions    DOXYCYCLINE HYCLATE (VIBRAMYCIN) 100 MG CAPSULE    Take 1 capsule (100 mg) by mouth 2 times daily for 5 days    ONDANSETRON (ZOFRAN ODT) 4 MG ODT TAB    Take 1 tablet (4 mg) by mouth every 8 hours as needed for nausea        1/11/2023   Evaristo Rhodes MD Steinbrueck, Emily, PA-C  01/11/23 5153

## 2023-02-06 ENCOUNTER — ANCILLARY ORDERS (OUTPATIENT)
Dept: MAMMOGRAPHY | Facility: CLINIC | Age: 63
End: 2023-02-06

## 2023-02-06 DIAGNOSIS — Z12.31 VISIT FOR SCREENING MAMMOGRAM: ICD-10-CM

## 2023-02-06 DIAGNOSIS — D24.1 FIBROADENOMA OF BREAST, RIGHT: ICD-10-CM

## 2023-02-08 ENCOUNTER — ANCILLARY ORDERS (OUTPATIENT)
Dept: MAMMOGRAPHY | Facility: CLINIC | Age: 63
End: 2023-02-08

## 2023-02-08 DIAGNOSIS — D24.1 FIBROADENOMA OF RIGHT BREAST: ICD-10-CM

## 2023-02-08 DIAGNOSIS — Z12.39 SCREENING BREAST EXAMINATION: ICD-10-CM

## 2023-02-09 ENCOUNTER — APPOINTMENT (OUTPATIENT)
Dept: INTERPRETER SERVICES | Facility: CLINIC | Age: 63
End: 2023-02-09
Payer: COMMERCIAL

## 2023-02-16 ENCOUNTER — ANCILLARY ORDERS (OUTPATIENT)
Dept: MAMMOGRAPHY | Facility: CLINIC | Age: 63
End: 2023-02-16

## 2023-02-16 ENCOUNTER — ANCILLARY PROCEDURE (OUTPATIENT)
Dept: MAMMOGRAPHY | Facility: CLINIC | Age: 63
End: 2023-02-16
Attending: ADVANCED PRACTICE MIDWIFE
Payer: COMMERCIAL

## 2023-02-16 DIAGNOSIS — Z12.39 SCREENING BREAST EXAMINATION: ICD-10-CM

## 2023-02-16 DIAGNOSIS — D24.1 FIBROADENOMA OF RIGHT BREAST: ICD-10-CM

## 2023-02-16 DIAGNOSIS — Z12.31 VISIT FOR SCREENING MAMMOGRAM: ICD-10-CM

## 2023-02-16 PROCEDURE — 77067 SCR MAMMO BI INCL CAD: CPT | Performed by: RADIOLOGY

## 2023-06-05 DIAGNOSIS — K21.9 GASTROESOPHAGEAL REFLUX DISEASE, UNSPECIFIED WHETHER ESOPHAGITIS PRESENT: ICD-10-CM

## 2023-06-07 NOTE — TELEPHONE ENCOUNTER
Medication is being filled for 1 time refill only due to:  pt overdue for follow up.     Patient was due to return to clinic around 2/13/23.  Called patient with a [place]  and assisted in scheduling an appointment.     Next 5 appointments (look out 90 days)    Jul 13, 2023  4:30 PM  (Arrive by 4:10 PM)  Provider Visit with Ruiz Henderson NP  [facility] ([facility] ) [address]  [address]  [facility] [address] [phone]

## 2023-07-13 ENCOUNTER — OFFICE VISIT (OUTPATIENT)
Dept: INTERNAL MEDICINE | Facility: CLINIC | Age: 63
End: 2023-07-13
Payer: COMMERCIAL

## 2023-07-13 VITALS
HEART RATE: 104 BPM | WEIGHT: 154.1 LBS | SYSTOLIC BLOOD PRESSURE: 124 MMHG | BODY MASS INDEX: 26.31 KG/M2 | OXYGEN SATURATION: 99 % | TEMPERATURE: 97.9 F | HEIGHT: 64 IN | DIASTOLIC BLOOD PRESSURE: 80 MMHG | RESPIRATION RATE: 18 BRPM

## 2023-07-13 DIAGNOSIS — R09.81 NASAL CONGESTION: ICD-10-CM

## 2023-07-13 DIAGNOSIS — K21.9 GASTROESOPHAGEAL REFLUX DISEASE, UNSPECIFIED WHETHER ESOPHAGITIS PRESENT: ICD-10-CM

## 2023-07-13 DIAGNOSIS — H53.9 VISION CHANGES: ICD-10-CM

## 2023-07-13 DIAGNOSIS — Z13.220 LIPID SCREENING: ICD-10-CM

## 2023-07-13 DIAGNOSIS — R73.09 ELEVATED GLUCOSE: ICD-10-CM

## 2023-07-13 DIAGNOSIS — K59.00 CONSTIPATION, UNSPECIFIED CONSTIPATION TYPE: ICD-10-CM

## 2023-07-13 DIAGNOSIS — Z00.00 ROUTINE GENERAL MEDICAL EXAMINATION AT A HEALTH CARE FACILITY: ICD-10-CM

## 2023-07-13 DIAGNOSIS — M25.511 CHRONIC RIGHT SHOULDER PAIN: Primary | ICD-10-CM

## 2023-07-13 DIAGNOSIS — G89.29 CHRONIC RIGHT SHOULDER PAIN: Primary | ICD-10-CM

## 2023-07-13 LAB
BASOPHILS # BLD AUTO: 0 10E3/UL (ref 0–0.2)
BASOPHILS NFR BLD AUTO: 0 %
EOSINOPHIL # BLD AUTO: 0.6 10E3/UL (ref 0–0.7)
EOSINOPHIL NFR BLD AUTO: 8 %
ERYTHROCYTE [DISTWIDTH] IN BLOOD BY AUTOMATED COUNT: 14.5 % (ref 10–15)
HCT VFR BLD AUTO: 43.7 % (ref 35–47)
HGB BLD-MCNC: 14.2 G/DL (ref 11.7–15.7)
IMM GRANULOCYTES # BLD: 0 10E3/UL
IMM GRANULOCYTES NFR BLD: 0 %
LYMPHOCYTES # BLD AUTO: 3.2 10E3/UL (ref 0.8–5.3)
LYMPHOCYTES NFR BLD AUTO: 40 %
MCH RBC QN AUTO: 27.4 PG (ref 26.5–33)
MCHC RBC AUTO-ENTMCNC: 32.5 G/DL (ref 31.5–36.5)
MCV RBC AUTO: 84 FL (ref 78–100)
MONOCYTES # BLD AUTO: 0.6 10E3/UL (ref 0–1.3)
MONOCYTES NFR BLD AUTO: 7 %
NEUTROPHILS # BLD AUTO: 3.5 10E3/UL (ref 1.6–8.3)
NEUTROPHILS NFR BLD AUTO: 44 %
PLATELET # BLD AUTO: 180 10E3/UL (ref 150–450)
RBC # BLD AUTO: 5.18 10E6/UL (ref 3.8–5.2)
WBC # BLD AUTO: 8 10E3/UL (ref 4–11)

## 2023-07-13 PROCEDURE — 83036 HEMOGLOBIN GLYCOSYLATED A1C: CPT

## 2023-07-13 PROCEDURE — 80053 COMPREHEN METABOLIC PANEL: CPT

## 2023-07-13 PROCEDURE — 85025 COMPLETE CBC W/AUTO DIFF WBC: CPT

## 2023-07-13 PROCEDURE — 36415 COLL VENOUS BLD VENIPUNCTURE: CPT

## 2023-07-13 PROCEDURE — 84443 ASSAY THYROID STIM HORMONE: CPT

## 2023-07-13 PROCEDURE — 99214 OFFICE O/P EST MOD 30 MIN: CPT

## 2023-07-13 PROCEDURE — 80061 LIPID PANEL: CPT

## 2023-07-13 RX ORDER — FLUTICASONE PROPIONATE 50 MCG
1 SPRAY, SUSPENSION (ML) NASAL DAILY
Qty: 11.1 ML | Refills: 1 | Status: SHIPPED | OUTPATIENT
Start: 2023-07-13 | End: 2024-09-11

## 2023-07-13 RX ORDER — FAMOTIDINE 20 MG/1
20 TABLET, FILM COATED ORAL 2 TIMES DAILY
Qty: 180 TABLET | Refills: 1 | Status: SHIPPED | OUTPATIENT
Start: 2023-07-13 | End: 2024-04-02

## 2023-07-13 NOTE — LETTER
July 20, 2023      Lucia Beltre  [address] [place] [address]  [place] [address]        Dear ,    We are writing to inform you of your test results.    Your labs indicate that you have prediabetes. I would not recommend medication at this time but you should work on eating a low carb diet, and exercise.     All other labs are in acceptable levels      Resulted Orders   Comprehensive metabolic panel (BMP + Alb, Alk Phos, ALT, AST, Total. Bili, TP)   Result Value Ref Range    Sodium 142 136 - 145 mmol/L    Potassium 4.0 3.4 - 5.3 mmol/L    Chloride 107 98 - 107 mmol/L    Carbon Dioxide (CO2) 22 22 - 29 mmol/L    Anion Gap 13 7 - 15 mmol/L    Urea Nitrogen 15.8 8.0 - 23.0 mg/dL    Creatinine 0.72 0.51 - 0.95 mg/dL    Calcium 9.6 8.8 - 10.2 mg/dL    Glucose 105 (H) 70 - 99 mg/dL    Alkaline Phosphatase 81 35 - 104 U/L    AST 28 0 - 45 U/L      Comment:      Reference intervals for this test were updated on 6/12/2023 to more accurately reflect our healthy population. There may be differences in the flagging of prior results with similar values performed with this method. Interpretation of those prior results can be made in the context of the updated reference intervals.    ALT 12 0 - 50 U/L      Comment:      Reference intervals for this test were updated on 6/12/2023 to more accurately reflect our healthy population. There may be differences in the flagging of prior results with similar values performed with this method. Interpretation of those prior results can be made in the context of the updated reference intervals.      Protein Total 7.7 6.4 - 8.3 g/dL    Albumin 4.1 3.5 - 5.2 g/dL    Bilirubin Total 0.2 <=1.2 mg/dL    GFR Estimate >90 >60 mL/min/1.73m2   TSH with free T4 reflex   Result Value Ref Range    TSH 3.71 0.30 - 4.20 uIU/mL   Lipid panel reflex to direct LDL Non-fasting   Result Value Ref Range    Cholesterol 208 (H) <200 mg/dL    Triglycerides 162 (H) <150 mg/dL    Direct Measure HDL 55  >=50 mg/dL    LDL Cholesterol Calculated 121 (H) <=100 mg/dL    Non HDL Cholesterol 153 (H) <130 mg/dL    Narrative    Cholesterol  Desirable:  <200 mg/dL    Triglycerides  Normal:  Less than 150 mg/dL  Borderline High:  150-199 mg/dL  High:  200-499 mg/dL  Very High:  Greater than or equal to 500 mg/dL    Direct Measure HDL  Female:  Greater than or equal to 50 mg/dL   Male:  Greater than or equal to 40 mg/dL    LDL Cholesterol  Desirable:  <100mg/dL  Above Desirable:  100-129 mg/dL   Borderline High:  130-159 mg/dL   High:  160-189 mg/dL   Very High:  >= 190 mg/dL    Non HDL Cholesterol  Desirable:  130 mg/dL  Above Desirable:  130-159 mg/dL  Borderline High:  160-189 mg/dL  High:  190-219 mg/dL  Very High:  Greater than or equal to 220 mg/dL   Hemoglobin A1c   Result Value Ref Range    Hemoglobin A1C 6.0 (H) 0.0 - 5.6 %      Comment:      Normal <5.7%   Prediabetes 5.7-6.4%    Diabetes 6.5% or higher     Note: Adopted from ADA consensus guidelines.   CBC with platelets and differential   Result Value Ref Range    WBC Count 8.0 4.0 - 11.0 10e3/uL    RBC Count 5.18 3.80 - 5.20 10e6/uL    Hemoglobin 14.2 11.7 - 15.7 g/dL    Hematocrit 43.7 35.0 - 47.0 %    MCV 84 78 - 100 fL    MCH 27.4 26.5 - 33.0 pg    MCHC 32.5 31.5 - 36.5 g/dL    RDW 14.5 10.0 - 15.0 %    Platelet Count 180 150 - 450 10e3/uL    % Neutrophils 44 %    % Lymphocytes 40 %    % Monocytes 7 %    % Eosinophils 8 %    % Basophils 0 %    % Immature Granulocytes 0 %    Absolute Neutrophils 3.5 1.6 - 8.3 10e3/uL    Absolute Lymphocytes 3.2 0.8 - 5.3 10e3/uL    Absolute Monocytes 0.6 0.0 - 1.3 10e3/uL    Absolute Eosinophils 0.6 0.0 - 0.7 10e3/uL    Absolute Basophils 0.0 0.0 - 0.2 10e3/uL    Absolute Immature Granulocytes 0.0 <=0.4 10e3/uL       If you have any questions or concerns, please call the clinic at the number listed above.       Sincerely,      Ruiz Henderson NP

## 2023-07-13 NOTE — PROGRESS NOTES
Assessment & Plan     Chronic right shoulder pain  Patient has had ongoing right shoulder pain.  She does not have full range of motion above head.  Pain radiates into deltoid.  Will refer for physical therapy massage therapy and shoulder specialist  - Orthopedic  Referral; Future  - Physical Therapy Referral; Future  - Massage Therapy Referral; Future    Nasal congestion  Patient uses Flonase for nasal congestion  - fluticasone (FLONASE) 50 MCG/ACT nasal spray; Spray 1 spray into both nostrils daily    Vision changes  Patient's glasses recently broke and she needs a new pair.  Referral to ophthalmology  - Adult Eye  Referral; Future    Gastroesophageal reflux disease, unspecified whether esophagitis present  Patient's symptoms of GERD are controlled with omeprazole but she ran out of medication.  I recommend that she try taking Pepcid twice a day to see if this controls symptoms.  If Pepcid is not sufficient in controlling symptoms she should use omeprazole  - famotidine (PEPCID) 20 MG tablet; Take 1 tablet (20 mg) by mouth 2 times daily  - omeprazole (PRILOSEC) 20 MG DR capsule; Take if no relief from taking pepcid    Constipation, unspecified constipation type  Patient has struggled with ongoing with constipation.  She states that she used MiraLAX for about 2 months and it did not help I will recommend that she try Metamucil.  We will also check thyroid to see if this is contributing  - TSH with free T4 reflex; Future  - TSH with free T4 reflex    Lipid screening    - Lipid panel reflex to direct LDL Non-fasting; Future  - Lipid panel reflex to direct LDL Non-fasting    Elevated glucose    - Hemoglobin A1c; Future  - Hemoglobin A1c    Routine general medical examination at a health care facility  Update labs- ordered in September 2022 but were never collected  - Comprehensive metabolic panel (BMP + Alb, Alk Phos, ALT, AST, Total. Bili, TP)  - CBC with platelets and differential     BMI:  "  Estimated body mass index is 26.45 kg/m  as calculated from the following:    Height as of this encounter: 1.626 m (5' 4\").    Weight as of this encounter: 69.9 kg (154 lb 1.6 oz).       Ruiz Henderson NP  [facility] TAPAN Myers is a 63 year old, presenting for the following health issues:  RECHECK (Follow up.)        7/13/2023     3:58 PM   Additional Questions   Roomed by Lilli Chavez MA   Accompanied by Her Daughter & Phone      HPI     R shoulder pain- has been ongoing since last September    Constipation ongoing for years. Denies nausea     Ran out of omeprazole    Review of Systems   Constitutional, HEENT, cardiovascular, pulmonary, GI, , musculoskeletal, neuro, skin, endocrine and psych systems are negative, except as otherwise noted.      Objective    /80 (BP Location: Left arm, Patient Position: Sitting, Cuff Size: Adult Large)   Pulse 104   Temp 97.9  F (36.6  C) (Oral)   Resp 18   Ht 1.626 m (5' 4\")   Wt 69.9 kg (154 lb 1.6 oz)   LMP  (LMP Unknown)   SpO2 99%   BMI 26.45 kg/m    Body mass index is 26.45 kg/m .  Physical Exam   GENERAL:  alert and no distress  EYES: Eyes grossly normal to inspection  NECK: no adenopathy, no asymmetry, masses, or scars and thyroid normal to palpation  RESP: lungs clear to auscultation - no rales, rhonchi or wheezes  CV: regular rate and rhythm, normal S1 S2, no S3 or S4, no murmur, click or rub, no peripheral edema and peripheral pulses strong  ABDOMEN: soft, nontender, no hepatosplenomegaly, no masses and bowel sounds normal  MS: no gross musculoskeletal defects noted, no edema  SKIN: no suspicious lesions or rashes  NEURO: Normal strength and tone, mentation intact and speech normal  PSYCH: mentation appears normal, affect normal/bright    "

## 2023-07-13 NOTE — PATIENT INSTRUCTIONS
Try to take Pepcid twice per day and if that does not work than you can take omeprazole    Metamucil is over the counter and can help with constipation

## 2023-07-14 ENCOUNTER — APPOINTMENT (OUTPATIENT)
Dept: INTERPRETER SERVICES | Facility: CLINIC | Age: 63
End: 2023-07-14
Payer: COMMERCIAL

## 2023-07-14 LAB
ALBUMIN SERPL BCG-MCNC: 4.1 G/DL (ref 3.5–5.2)
ALP SERPL-CCNC: 81 U/L (ref 35–104)
ALT SERPL W P-5'-P-CCNC: 12 U/L (ref 0–50)
ANION GAP SERPL CALCULATED.3IONS-SCNC: 13 MMOL/L (ref 7–15)
AST SERPL W P-5'-P-CCNC: 28 U/L (ref 0–45)
BILIRUB SERPL-MCNC: 0.2 MG/DL
BUN SERPL-MCNC: 15.8 MG/DL (ref 8–23)
CALCIUM SERPL-MCNC: 9.6 MG/DL (ref 8.8–10.2)
CHLORIDE SERPL-SCNC: 107 MMOL/L (ref 98–107)
CHOLEST SERPL-MCNC: 208 MG/DL
CREAT SERPL-MCNC: 0.72 MG/DL (ref 0.51–0.95)
DEPRECATED HCO3 PLAS-SCNC: 22 MMOL/L (ref 22–29)
GFR SERPL CREATININE-BSD FRML MDRD: >90 ML/MIN/1.73M2
GLUCOSE SERPL-MCNC: 105 MG/DL (ref 70–99)
HBA1C MFR BLD: 6 % (ref 0–5.6)
HDLC SERPL-MCNC: 55 MG/DL
LDLC SERPL CALC-MCNC: 121 MG/DL
NONHDLC SERPL-MCNC: 153 MG/DL
POTASSIUM SERPL-SCNC: 4 MMOL/L (ref 3.4–5.3)
PROT SERPL-MCNC: 7.7 G/DL (ref 6.4–8.3)
SODIUM SERPL-SCNC: 142 MMOL/L (ref 136–145)
TRIGL SERPL-MCNC: 162 MG/DL
TSH SERPL DL<=0.005 MIU/L-ACNC: 3.71 UIU/ML (ref 0.3–4.2)

## 2023-07-17 ENCOUNTER — TELEPHONE (OUTPATIENT)
Dept: INTERNAL MEDICINE | Facility: CLINIC | Age: 63
End: 2023-07-17
Payer: COMMERCIAL

## 2023-07-17 DIAGNOSIS — H04.123 DRY EYES: Primary | ICD-10-CM

## 2023-07-17 NOTE — TELEPHONE ENCOUNTER
olopatadine (PATANOL) 0.1 % ophthalmic solution      Last Written Prescription Date:  09/08/22  Last Fill Quantity: 5 mL,   # refills: 1  Last Office Visit: 07/13/23  Future Office visit:       Routing refill request to provider for review/approval because:  Drug not active on patient's medication list

## 2023-07-20 RX ORDER — OLOPATADINE HYDROCHLORIDE 1 MG/ML
1 SOLUTION/ DROPS OPHTHALMIC 2 TIMES DAILY
Qty: 5 ML | Refills: 1 | Status: SHIPPED | OUTPATIENT
Start: 2023-07-20

## 2023-07-31 ENCOUNTER — THERAPY VISIT (OUTPATIENT)
Dept: PHYSICAL THERAPY | Facility: CLINIC | Age: 63
End: 2023-07-31
Payer: COMMERCIAL

## 2023-07-31 DIAGNOSIS — M25.511 CHRONIC RIGHT SHOULDER PAIN: ICD-10-CM

## 2023-07-31 DIAGNOSIS — G89.29 CHRONIC RIGHT SHOULDER PAIN: ICD-10-CM

## 2023-07-31 PROCEDURE — 97110 THERAPEUTIC EXERCISES: CPT | Mod: GP | Performed by: PHYSICAL THERAPIST

## 2023-07-31 PROCEDURE — 97161 PT EVAL LOW COMPLEX 20 MIN: CPT | Mod: GP | Performed by: PHYSICAL THERAPIST

## 2023-07-31 NOTE — PROGRESS NOTES
PHYSICAL THERAPY EVALUATION  Type of Visit: Evaluation    See electronic medical record for Abuse and Falls Screening details.    Subjective       Presenting condition or subjective complaint: R shoulder; chronic in nature  Date of onset: 04/30/23    Relevant medical history:     Dates & types of surgery: none    Prior diagnostic imaging/testing results: X-ray; MRI     Prior therapy history for the same diagnosis, illness or injury: No          Employment: No none  Hobbies/Interests: cooking, cleaning, taking care of kids    Patient goals for therapy: dress, bath, reaching    Pain assessment: Pain present  Location: R shoulder /Rating: rest 7/10  worst 7/10     Objective   SHOULDER:    Standing Posture: rounded shoulders and forward head    Cervical Screen: flexion=full (pain on right); extension= full (pain on right); R and L rotation= full (painful)      Shoulder: (* indicates patient's pain)   AROM L AROM R MMT L MMT R   Flex 130 110* 5/5 4/5*   Abd       IR   4/5 3+/5   ER 50 50* 5/5 4/5*   Ext/IR T8 L1*       Palpation: tender to palpation over biceps, supra, and UT on rightPHYSICAL THERAPY       Assessment & Plan   CLINICAL IMPRESSIONS  Medical Diagnosis: Chronic R shoulder pain    Treatment Diagnosis: R rotator cuff tendonopathy, and posture dysfunction   Impression/Assessment: Patient is a 63 year old female with R shoulder complaints.  The following significant findings have been identified: Pain, Decreased ROM/flexibility, Decreased joint mobility, Decreased strength, Inflammation, Impaired muscle performance, Decreased activity tolerance, Impaired posture, and Instability. These impairments interfere with their ability to perform self care tasks, recreational activities, and household chores as compared to previous level of function.     Clinical Decision Making (Complexity):  Clinical Presentation: Stable/Uncomplicated  Clinical Presentation Rationale: based on medical and personal factors listed in PT  evaluation  Clinical Decision Making (Complexity): Low complexity    PLAN OF CARE  Treatment Interventions:  Interventions: Manual Therapy, Neuromuscular Re-education, Therapeutic Activity, Therapeutic Exercise    Long Term Goals     PT Goal 1  Goal Identifier: Goal 1  Goal Description: In 10 weeks, pt will be able to reach overhead, painfree  Rationale: to maximize safety and independence with performance of ADLs and functional tasks  Target Date: 10/09/23      Frequency of Treatment: 1X/week  Duration of Treatment: 10 weeks      Education Assessment:   Learner/Method: Patient;Pictures/Video    Risks and benefits of evaluation/treatment have been explained.   Patient/Family/caregiver agrees with Plan of Care.     Evaluation Time:     PT Eval, Low Complexity Minutes (74213): 15       Signing Clinician: YOMAIRA Flores [facility]                                                                                   OUTPATIENT PHYSICAL THERAPY      PLAN OF TREATMENT FOR OUTPATIENT REHABILITATION   Patient's Last Name, First Name, Lucia Mata YOB: 1960   Provider's Name   [facility]   Medical Record No.  [id number]     Onset Date: 04/30/23  Start of Care Date: 07/31/23     Medical Diagnosis:  Chronic R shoulder pain      PT Treatment Diagnosis:  R rotator cuff tendonopathy, and posture dysfunction Plan of Treatment  Frequency/Duration: 1X/week/ 10 weeks    Certification date from 07/31/23 to 10/09/23         See note for plan of treatment details and functional goals     Gail Schofield, PT                         I CERTIFY THE NEED FOR THESE SERVICES FURNISHED UNDER        THIS PLAN OF TREATMENT AND WHILE UNDER MY CARE     (Physician attestation of this document indicates review and certification of the therapy plan).                Referring Provider:  Ruiz Henderson      Initial Assessment  See Epic Evaluation- Start of Care Date:  07/31/23

## 2023-08-07 ENCOUNTER — THERAPY VISIT (OUTPATIENT)
Dept: PHYSICAL THERAPY | Facility: CLINIC | Age: 63
End: 2023-08-07
Payer: COMMERCIAL

## 2023-08-07 DIAGNOSIS — G89.29 CHRONIC RIGHT SHOULDER PAIN: Primary | ICD-10-CM

## 2023-08-07 DIAGNOSIS — M25.511 CHRONIC RIGHT SHOULDER PAIN: Primary | ICD-10-CM

## 2023-08-07 PROCEDURE — 97110 THERAPEUTIC EXERCISES: CPT | Mod: GP | Performed by: PHYSICAL THERAPIST

## 2023-08-09 ENCOUNTER — OFFICE VISIT (OUTPATIENT)
Dept: OPHTHALMOLOGY | Facility: CLINIC | Age: 63
End: 2023-08-09
Payer: COMMERCIAL

## 2023-08-09 DIAGNOSIS — H25.813 COMBINED FORMS OF AGE-RELATED CATARACT OF BOTH EYES: ICD-10-CM

## 2023-08-09 DIAGNOSIS — H04.123 DRY EYE SYNDROME OF BOTH EYES: ICD-10-CM

## 2023-08-09 DIAGNOSIS — H52.203 HYPEROPIA OF BOTH EYES WITH ASTIGMATISM AND PRESBYOPIA: Primary | ICD-10-CM

## 2023-08-09 DIAGNOSIS — H53.9 VISION CHANGES: ICD-10-CM

## 2023-08-09 DIAGNOSIS — H52.4 HYPEROPIA OF BOTH EYES WITH ASTIGMATISM AND PRESBYOPIA: Primary | ICD-10-CM

## 2023-08-09 DIAGNOSIS — H52.03 HYPEROPIA OF BOTH EYES WITH ASTIGMATISM AND PRESBYOPIA: Primary | ICD-10-CM

## 2023-08-09 PROCEDURE — G0463 HOSPITAL OUTPT CLINIC VISIT: HCPCS | Performed by: OPHTHALMOLOGY

## 2023-08-09 PROCEDURE — 92015 DETERMINE REFRACTIVE STATE: CPT | Performed by: OPHTHALMOLOGY

## 2023-08-09 PROCEDURE — 92015 DETERMINE REFRACTIVE STATE: CPT

## 2023-08-09 PROCEDURE — 92004 COMPRE OPH EXAM NEW PT 1/>: CPT | Performed by: OPHTHALMOLOGY

## 2023-08-09 ASSESSMENT — REFRACTION_MANIFEST
OD_SPHERE: +1.50
OD_CYLINDER: +0.75
OS_CYLINDER: +0.50
OD_ADD: +2.50
OS_SPHERE: +2.00
OS_AXIS: 160
OS_ADD: +2.50
OD_AXIS: 055

## 2023-08-09 ASSESSMENT — TONOMETRY
IOP_METHOD: TONOPEN
OS_IOP_MMHG: 15
OD_IOP_MMHG: 13

## 2023-08-09 ASSESSMENT — CONF VISUAL FIELD
OD_INFERIOR_NASAL_RESTRICTION: 0
OD_NORMAL: 1
OS_SUPERIOR_NASAL_RESTRICTION: 0
OS_SUPERIOR_TEMPORAL_RESTRICTION: 0
OS_INFERIOR_NASAL_RESTRICTION: 0
OD_SUPERIOR_TEMPORAL_RESTRICTION: 0
OS_NORMAL: 1
OS_INFERIOR_TEMPORAL_RESTRICTION: 0
METHOD: COUNTING FINGERS
OD_INFERIOR_TEMPORAL_RESTRICTION: 0
OD_SUPERIOR_NASAL_RESTRICTION: 0

## 2023-08-09 ASSESSMENT — VISUAL ACUITY
METHOD: TUMBLING E 'S
OS_SC: 20/40
OD_SC: 20/60

## 2023-08-09 ASSESSMENT — CUP TO DISC RATIO
OD_RATIO: 0.3
OS_RATIO: 0.3

## 2023-08-09 ASSESSMENT — SLIT LAMP EXAM - LIDS
COMMENTS: NORMAL
COMMENTS: NORMAL

## 2023-08-09 NOTE — PROGRESS NOTES
HPI       COMPREHENSIVE EYE EXAM    In both eyes.  Associated symptoms include eye pain, tearing and burning.  Negative for flashes and floaters.  Treatments tried include artificial tears.             Comments    Lucia Beltre is a(n) 63 year old female who presents for a comprehensive exam. Last eye exam was 2 year(s) ago. Since exam, vision has gradually worsened. Uses lubricating drops. No flashes and floaters. Some eye pain. Intermittent excessive tearing.      Lab Results       Component                Value               Date                       A1C                      6.0                 07/13/2023              Cleve Epps COT 2:33 PM August 9, 2023     denies family history of ocular conditions  denies history of ocular surgeries     History and physical examination completed with assistance of telephone                Last edited by Rolando Watters MD on 8/9/2023  3:49 PM.         Review of systems for the eyes was negative other than the pertinent positives/negatives listed in the HPI.      Assessment & Plan    HPI:  Lucia Beltre is a 63 year old female with history GERD presents for eye exam. Notes increased difficulty with near vision and increased tearing. Not using anything currently. Denies redness, flashes or floaters.       POHx: denies  PMHx: GERD  Current Medications: famotidine (PEPCID) 20 MG tablet, Take 1 tablet (20 mg) by mouth 2 times daily  fluticasone (FLONASE) 50 MCG/ACT nasal spray, Spray 1 spray into both nostrils daily  olopatadine (PATANOL) 0.1 % ophthalmic solution, Place 1 drop into both eyes 2 times daily  omeprazole (PRILOSEC) 20 MG DR capsule, Take if no relief from taking pepcid    No current facility-administered medications on file prior to visit.    FHx: denies family history of ocular conditions   PSHx: denies history of ocular surgeries       Current Eye Medications:      Assessment & Plan:  (H53.9) Vision changes  (H52.03,  H52.203,  H52.4)  Hyperopia of both eyes with astigmatism and presbyopia   Patient has hyperopia and a copy of today's glasses prescription was given    Presbyopia is difficulty seeing up close and is treated with bifocals or over the counter reading glasses    (H25.813) Combined forms of age-related cataract of both eyes  Mild cataracts are present and may account for some of the patient's visual complaints. No treatment currently recommended. The patient will monitor for vision changes and contact us with any decrease in vision. Recheck in one year.     (H04.123) Dry eye syndrome of both eyes  Start AT four times a day      Return in about 1 year (around 8/9/2024) for Annual Visit-v/t/d/MRx.    Rolando Watters MD     Attending Physician Attestation:  Complete documentation of historical and exam elements from today's encounter can be found in the full encounter summary report (not reduplicated in this progress note).  I personally obtained the chief complaint(s) and history of present illness.  I confirmed and edited as necessary the review of systems, past medical/surgical history, family history, social history, and examination findings as documented by others; and I examined the patient myself.  I personally reviewed the relevant tests, images, and reports as documented above.  I formulated and edited as necessary the assessment and plan and discussed the findings and management plan with the patient and family. - Rolando Wtaters MD

## 2023-08-09 NOTE — NURSING NOTE
Chief Complaints and History of Present Illnesses   Patient presents with    COMPREHENSIVE EYE EXAM     Chief Complaint(s) and History of Present Illness(es)       COMPREHENSIVE EYE EXAM              Laterality: both eyes    Associated symptoms: eye pain, tearing and burning.  Negative for flashes and floaters    Treatments tried: artificial tears              Comments    Lucia Beltre is a(n) 63 year old female who presents for a comprehensive exam. Last eye exam was 2 year(s) ago. Since exam, vision has gradually worsened. Uses lubricating drops. No flashes and floaters. Some eye pain. Intermittent excessive tearing.      Lab Results       Component                Value               Date                       A1C                      6.0                 07/13/2023              Cleve Guardadoo COT 2:33 PM August 9, 2023

## 2023-08-09 NOTE — PATIENT INSTRUCTIONS
"Use one drop of artificial tears both eyes 3-4 x daily.  Continue to use the drops regardless if your eyes are comfortable.  Artificial tears work best as a preventative and not as well after your eyes are starting to bother you.  Preservative-free drops are best if you will be using them more than 6x daily. Some brands include: Celluvisc, Refresh, Systane, Blink, Optive, iVizia. Avoid using any drops that state \"get the red out\".     It may take 4-6 weeks of using the drops before you notice improvement.  If after that time you are still having problems schedule an appointment for an evaluation and discussion of different treatments which may include medicated eye drops, punctal plugs to keep the tears that are made and supplemented on the surface of the eye longer, or other treatments. Dry eyes are a chronic condition and you may have more symptoms at certain times of the year.   "

## 2023-08-25 ENCOUNTER — APPOINTMENT (OUTPATIENT)
Dept: OPTOMETRY | Facility: CLINIC | Age: 63
End: 2023-08-25
Payer: COMMERCIAL

## 2023-08-25 PROCEDURE — 92341 FIT SPECTACLES BIFOCAL: CPT | Performed by: OPTOMETRIST

## 2023-09-11 ENCOUNTER — THERAPY VISIT (OUTPATIENT)
Dept: PHYSICAL THERAPY | Facility: CLINIC | Age: 63
End: 2023-09-11
Payer: COMMERCIAL

## 2023-09-11 DIAGNOSIS — M25.511 CHRONIC RIGHT SHOULDER PAIN: Primary | ICD-10-CM

## 2023-09-11 DIAGNOSIS — G89.29 CHRONIC RIGHT SHOULDER PAIN: Primary | ICD-10-CM

## 2023-09-11 PROCEDURE — 97112 NEUROMUSCULAR REEDUCATION: CPT | Mod: GP | Performed by: PHYSICAL THERAPIST

## 2023-09-11 PROCEDURE — 97110 THERAPEUTIC EXERCISES: CPT | Mod: GP | Performed by: PHYSICAL THERAPIST

## 2024-02-01 ENCOUNTER — OFFICE VISIT (OUTPATIENT)
Dept: INTERNAL MEDICINE | Facility: CLINIC | Age: 64
End: 2024-02-01
Payer: COMMERCIAL

## 2024-02-01 VITALS
HEIGHT: 64 IN | WEIGHT: 151 LBS | RESPIRATION RATE: 18 BRPM | SYSTOLIC BLOOD PRESSURE: 118 MMHG | DIASTOLIC BLOOD PRESSURE: 68 MMHG | BODY MASS INDEX: 25.78 KG/M2 | OXYGEN SATURATION: 100 % | TEMPERATURE: 97.8 F | HEART RATE: 90 BPM

## 2024-02-01 DIAGNOSIS — M62.81 GENERALIZED MUSCLE WEAKNESS: Primary | ICD-10-CM

## 2024-02-01 DIAGNOSIS — M25.561 ACUTE PAIN OF BOTH KNEES: ICD-10-CM

## 2024-02-01 DIAGNOSIS — M25.562 ACUTE PAIN OF BOTH KNEES: ICD-10-CM

## 2024-02-01 DIAGNOSIS — K21.9 GASTROESOPHAGEAL REFLUX DISEASE, UNSPECIFIED WHETHER ESOPHAGITIS PRESENT: ICD-10-CM

## 2024-02-01 LAB
ERYTHROCYTE [DISTWIDTH] IN BLOOD BY AUTOMATED COUNT: 14 % (ref 10–15)
HCT VFR BLD AUTO: 42 % (ref 35–47)
HGB BLD-MCNC: 13.7 G/DL (ref 11.7–15.7)
MCH RBC QN AUTO: 27.5 PG (ref 26.5–33)
MCHC RBC AUTO-ENTMCNC: 32.6 G/DL (ref 31.5–36.5)
MCV RBC AUTO: 84 FL (ref 78–100)
PLATELET # BLD AUTO: 286 10E3/UL (ref 150–450)
RBC # BLD AUTO: 4.99 10E6/UL (ref 3.8–5.2)
WBC # BLD AUTO: 5.9 10E3/UL (ref 4–11)

## 2024-02-01 PROCEDURE — 36415 COLL VENOUS BLD VENIPUNCTURE: CPT | Performed by: INTERNAL MEDICINE

## 2024-02-01 PROCEDURE — 85027 COMPLETE CBC AUTOMATED: CPT | Performed by: INTERNAL MEDICINE

## 2024-02-01 PROCEDURE — 80053 COMPREHEN METABOLIC PANEL: CPT | Performed by: INTERNAL MEDICINE

## 2024-02-01 PROCEDURE — 99214 OFFICE O/P EST MOD 30 MIN: CPT | Performed by: INTERNAL MEDICINE

## 2024-02-01 PROCEDURE — 84443 ASSAY THYROID STIM HORMONE: CPT | Performed by: INTERNAL MEDICINE

## 2024-02-01 PROCEDURE — 82306 VITAMIN D 25 HYDROXY: CPT | Performed by: INTERNAL MEDICINE

## 2024-02-01 PROCEDURE — 84439 ASSAY OF FREE THYROXINE: CPT | Performed by: INTERNAL MEDICINE

## 2024-02-01 ASSESSMENT — PAIN SCALES - GENERAL: PAINLEVEL: SEVERE PAIN (6)

## 2024-02-01 NOTE — COMMUNITY RESOURCES LIST (ENGLISH)
02/01/2024   [facility]  N/A  For questions about this resource list or additional care needs, please contact your primary care clinic or care manager.  Phone: [phone]   Email: N/A   Address: [address] [place] [address]   Hours: N/A        Hotlines and Helplines       Hotline - Housing crisis  1  [facility] (Main Office) Distance: 6.21 miles      Phone/Virtual   [address] [place] [address]  Language: English  Hours: Mon - Sun Open 24 Hours   Phone: (277) 537-7122 Email: info@[employer] creations Website: http://iSOCO.c3 creations     2  [facility] Distance: 12.91 miles      Phone/Virtual   [address] [place] [address]  Language: English  Hours: Mon - Sun Open 24 Hours   Phone: (432) 149-6989 Email: info@[employer] creations Website: http://www.i[employer]O.org          Housing       Coordinated Entry access point  3  [facility] [employer] [facility] [place] ([facility] - Housing Services Distance: 13 miles      In-Person   [address] [place] [address]  Language: English  Hours: Mon - Fri 9:00 AM - 5:00 PM  Fees: Free   Phone: (186) 920-7753 Email: housing@[facility].org Website: http://www.[facility].org/housing     4  [facility] Distance: 15.3 miles      In-Person, Phone/Virtual   [address] [place] [address]  Language: English  Hours: Mon - Fri 8:30 AM - 4:30 PM  Fees: Free   Phone: (134) 561-2284 Email: info@[facility].org Website: https://www.[facility].org/locations/[facility]/     Drop-in center or day shelter  5  [facility] Distance: 13.39 miles      In-Person   [address] [place] [address]  Language: English  Hours: Mon - Fri 12:00 PM - 3:00 PM  Fees: Free   Phone: (546) 572-5212 Email: Sococo@[employer] Website: http://Sococo.org/     6  [facility] Distance: 13.55 miles       In-Person   [address] [place] [address]  Language: English, North Korean, [place] and Ca[place] Islander  Hours: Mon - Sat 7:00 AM - 3:00 PM  Fees: Free, Self Pay   Phone: (962) 390-8077 Email: info@[employer] Website: https://www.[employer]/locations/opportunity-center/     Housing search assistance  7  [facility] - Rental Homes for Future Homebuyers Program Distance: 3.76 miles      Phone/Virtual   [address] [place] [address]  Language: English  Hours: Mon - Fri 8:00 AM - 4:30 PM  Fees: Free   Phone: (550) 846-4325 Email: hra@[facility] Website: https://www.[facility]/hra/[place]-housing-and-fmcvskiefjnss-ikvflfvfx-wyw     8  [facility] - Online Housing Search Assistance Distance: 12.07 miles      Phone/Virtual   [address] [place] [address]  Language: English  Hours: Mon - Sun Open 24 Hours  Fees: Free   Phone: (799) 983-5668 Email: findhoushiraz@[employer] Website: https://[employer]/     Shelter for families  9  [facility] Distance: 8.71 miles      In-Person   [address] [place] [address]  Language: English  Hours: Mon - Sun Open 24 Hours  Fees: Free, Sliding Fee   Phone: (295) 877-4198 Ext.1 Email: info@[facility] Website: http://www.[facility].org     Shelter for individuals  10  [facility] [place] Distance: 8.98 miles      In-Person   [address] [place] [address]  Language: English, [place] Islander  Hours: Mon - Fri 8:00 AM - 4:30 PM  Fees: Free   Phone: (241) 898-3018 Email: info@[facility] [employer].org Website: http://www.[facility].org     11  [facility] ([facility] [place] Distance: 11.21 miles      In-Person   [address] [place] [address]  Language: English, [place] Islander  Hours: Mon - Fri 8:00 AM - 4:30 PM  Fees: Free   Phone: (085)  300-6200 Email: info@[employer] Website: https://www.[employer].org/          Transportation       Free or low-cost transportation  12  [employer]. Distance: 4.16 miles      In-Person   [address] [facility] [address] [place] [address]  Language: English  Hours: Mon - Fri 7:00 AM - 5:00 PM  Fees: Free   Phone: (385) 596-3780 Email: npdviafgogrh46@simpleFLOORS Website: https://R[employer]/     13  [employer] [facility] [facility] [place] [facility] [place] Distance: 12.19 miles      In-Person   [address] [place] [address]  Language: English  Hours: Mon - Fri 9:00 AM - 12:00 PM , Mon - Fri 1:00 PM - 3:00 PM  Fees: Self Pay   Phone: (775) 441-2817 Email: info@[employer] Website: https://www.[employer].org/[place]     Transportation to medical appointments  14  [employer] Distance: 2.46 miles      In-Person   [address] [place] [address]  Language: Kiswahili, English, Oromo, Serbian  Hours: Mon - Sat 5:00 AM - 9:00 PM  Fees: Insurance, Self Pay   Phone: (371) 214-4563 Email: info@[employer] Website: http://www.[employer].Infoflow/     15  [facility] Transportation Distance: 4.98 miles      [address] [place] [address]  Language: English  Hours: Mon - Fri 6:30 AM - 4:30 PM  Fees: Insurance, Self Pay   Phone: (613) 604-7586 Email: PcucldzjwQqdactdxdlpefa47@[employer]ORS Website: https://1-800-DENTIST.Infoflow          Important Numbers & Websites       Emergency Services   911  City Services   311  Poison Control   (621) 201-2909  Suicide Prevention Lifeline   (883) 159-6333 (TALK)  Child Abuse Hotline   (373) 796-5481 (4-A-Child)  Sexual Assault Hotline   (914) 915-7824 (HOPE)  National Runaway Safeline   (926) 509-6104 (RUNAWAY)  All-Options Talkline   (633) 820-2590  Substance Abuse Referral   (940) 914-5450 (HELP)

## 2024-02-01 NOTE — PROGRESS NOTES
"  Assessment & Plan     Generalized muscle weakness  - Comprehensive metabolic panel; Future  - CBC with platelets; Future  - Vitamin D Deficiency; Future  - TSH with free T4 reflex; Future  - Bath Seat/Shower Chair Order for DME - ONLY FOR DME  - Comprehensive metabolic panel  - CBC with platelets  - Vitamin D Deficiency  - TSH with free T4 reflex    Acute pain of both knees  - Cane Order for DME - ONLY FOR DME    Gastroesophageal reflux disease, unspecified whether esophagitis present    Patient comes in today accompanied by the daughter for the visit.  In person  used during the visit.  Patient comes in for follow-up after a recent fall in the bathroom  with resultant bilateral knee pain.  Underwent x-ray imaging which was reassuring.  Fall secondary to weakness and patient endorses some lightheadedness.  Denied any chest pain or shortness of breath or palpitations or loss of consciousness with the symptoms.  Does endorse having reduced appetite over the past 1 to 2 weeks with some congestive concerns and chills.  Weakness could be secondary to reduced p.o. intake and dehydration with the current common cold like symptoms.  Update lab work including electrolyte/creatinine check. We discussed on increasing PO intake and keeping well hydrated.cardiology work up could be considered once congestive symptoms are improved and if patient is still having weakness with lightheadedness.  Continue using Tylenol 1000 mg up to 3 times a day as needed.  Patient has GERD so to avoid NSAID use for pain concerns.  Would like to hold off on physical therapy for the generalized weakness and bilateral muscle aches.  Due to the high risk of recurrent falls with the weakness,patient will benefit from cane and shower chair.DME orders completed today. Handicap parking completed for 6 months.      BMI  Estimated body mass index is 25.92 kg/m  as calculated from the following:    Height as of this encounter: 1.626 m (5' 4\").    " "Weight as of this encounter: 68.5 kg (151 lb).             Julio Myers is a 64 year old, presenting for the following health issues:  Follow Up (Fall last week, right knee pain feeling )        2/1/2024     8:50 AM   Additional Questions   Roomed by Ryan   Accompanied by Daughter and Citizen of [place]       History of Present Illness       Reason for visit:  Been feeling sick    She eats 2-3 servings of fruits and vegetables daily.She consumes 0 sweetened beverage(s) daily.She exercises with enough effort to increase her heart rate 9 or less minutes per day.  She exercises with enough effort to increase her heart rate 3 or less days per week.   She is taking medications regularly.       Review of Systems  Constitutional, HEENT, cardiovascular, pulmonary, gi and gu systems are negative, except as otherwise noted.      Objective    /68 (BP Location: Right arm, Patient Position: Sitting, Cuff Size: Adult Large)   Pulse 90   Temp 97.8  F (36.6  C) (Tympanic)   Resp 18   Ht 1.626 m (5' 4\")   Wt 68.5 kg (151 lb)   LMP  (LMP Unknown)   SpO2 100%   BMI 25.92 kg/m    Body mass index is 25.92 kg/m .  Physical Exam   GENERAL: alert and no distress  RESP: lungs clear to auscultation - no rales, rhonchi or wheezes  CV: regular rate and rhythm, normal S1 S2  MS: no gross musculoskeletal defects noted, no edema  NEURO: Normal strength and tone, mentation intact and speech normal  PSYCH: mentation appears normal, affect normal.            Signed Electronically by: Nilda Dominguez MD    "

## 2024-02-01 NOTE — COMMUNITY RESOURCES LIST (PATIENT PREFERRED LANGUAGE)
02/01/2024   [facility]  N/A  Francotara shelly rowley , douglas crump  sree aviles.  Phone: [phone]   Email: N/A   Address: [facility] access hospital[address] [place] [address]   Hours: N/A        Fernandez Paez - Devinibaatada sarahriyaquelin  1  [facility] (Xafiiskmelissa Torres) Fogaanta: 6.21 miles      Telefoon/Virtual   [address] [place] [address]  Luuqad: Ingkojois  Raulitocadaha: Isniinta - Axad Furan 24 [facility]   Phone: [phone] Email: info@[facility].org Website: http://8minutenergy RenewablesPulaski Memorial Hospital.org     2  [facility] Fogaanta: 12.91 miles      Telefoon/Virtual   [address] [place] [address]  Luuqad: Ingiriis  Saacadaha: Isniinta - Axad Furan 24 [facility] [place]   Phone: [phone] Email: info@[facility].org Website: http://www.[facility].org          Bryce morejon [facility] Centernicolexiludwig  3  Stephani Araya [facility]) - Adeegyajamarcus Wu Fogaanta: 13 miles      Qof ahaan   [address] [place] [address]  Luuqad: Ingiriis  Raulitocadaha: Isniinta - Jimheather 9:00 GH - 5:00 GD  Rafy: Hanna   Phone: [phone] Email: housing@[facility].org Website: http://www.[facility].org/housing     4  Roberta Araya [facility] Fogaanta: 15.3 miles      Qof ahaan, Telefoon/Virtual   [address] [place] [address]  Springuqad: Soha Walker: Maximiliano Corey 8:30 GH - 4:30 GD  Rafy: Hanna   Phone: [phone] Email: info@[facility].org Website: https://www.[facility].org/locations/[facility]/     Jamir varghese  5  Audrey Barger Fogaanta: 13.39 miles      [facility]   [address] [place] [address]  Springuqad: Soha Walker: Maximiliano Corey 12:00 GD - 3:00 GD  Rafy: Hanna   Phone: [phone] Email:  peaMobiWorkcommunity@[employer].com Website: http://peacePileus Software.org/     6  Hay'adaha Katooliga ee [facility] - Xarunta Fursadaha Fogaanta: 13.55 miles      Qof ahaan   [address] [place] [address]  Luuqad: Ren Hoyos, Sojeffyali  Saacadaha: Isniinta - Sabti 7:00 GH - 3:00 GD  Kharashyada: Suyapa Ferreira Edwardoanjelica   Phone: [phone] Email: info@Mygistics Website: https://www.DeskGod.org/locations/opportunity-center/     Radha may  7  [facility] nicolle [facility] Barnaamiradhaa IibUAB Hospitala NadeemMason General Hospitalsaravanan Fogaanta: 3.76 miles      Telefoon/Virtual   [address] [place] [address]  Luuqad: Rebasuyapa Cabezascadaha: Isdaniellaa - Jimheather 8:00 GH - 4:30 GD  Kharashyada: Saúlaash   Phone: [phone] Email: hra@[facility] Website: https://www.[facility]/hra/[place]-housing-and-otwgytdytihcm-tnzypqhqf-fwe     8  [facility] CenterooanitaSitka Community [facility] - [facility] - Saravananalkenzie Wu Onbarbara [facility] Agustinadinkathryn Fogaanta: 12.07 miles      Telefoon/Virtual   [address] [place] [address]  Luuqad: Ingiriis  Saacadaha: Isniinta - Axad Furan 24 Saa  Kharashyada: Tyson lau   Phone: [phone] Email: angie@Livel[employer].Aha Mobile Website: https://Livelens.org/     Philippe qoysaska  9  Hoyga Qoyska ee [facility] Fogaanta: 8.71 miles      Qof ahaan   [address] [place] [address]  Luuqad: Soha Walker: Maximiliano Cornejo Furan 24 [facility]  Rafy: Aubrie Ferreira   Phone: [phone] Ext.1 Email: info@[facility] HospitalOsmopureNorthside Hospital [facility] Website: http://www.[facility] HospitalOsmopureNorthside Hospital [facility]     Philippe canales  10  Lyly Keeqbritt Araya (CAP)  Damien Hernandez & [facility] Fogaanta: 8.98 miles      [facility]   [address] [place] [address]  Luuqad: Ren Hoyos: Maximiliano Corey 8:00 GH - 4:30 GD  Rafy: Hanna   Phone: [phone]  Email: info@capKidlandia.org Website: http://www.Click & Grow.org     11  Ismiya Keeqabamark Bulmiracle (CAP) Damien Valdez & Carlos Hernandez Fogaanta: 11.21 miles      Qof ahaan   [address] [place] [address]  Luuqad: Soha Isbakat Cabezascadaha: Islayneinta - Jimce 8:00 GH - 4:30 GD  Kharashyada: Bilaash   Phone: [phone] Email: info@[employer].org Website: https://www.[employer] & [employer].org/          Diaz goddard  12  Akua Pingpigeon Ronaldo Fogaanta: 4.16 miles      Qof ahaan   [address] [facility] [address] [place] [address]  Luuqad: Soha Walker: Islauren - Leora 7:00 GH - 5:00 GD  Kharashyada: Bilaash   Phone: [phone] Email: LS9@[employer]upp[employer] Website: https://Clarabridge/     13  Amicus - [facility] [place] Fogaanta: 12.19 miles      Qof ahaan   [address] [place] [address]  Luuqad: Soha Cabezascadaha: Isnicolta - Jimheather 9:00 GH - 12:00 GD , Isnicolta - Jimce 1:00 GD - 3:00 GD  Kharashyada: Is Bixinta   Phone: [phone] Email: info@[employer]Care.org Website: https://www.voamnwi.org/[place]     Diaz galdamez  14  Dhaqdhaqaaqa Magaalajamarcus SmithLeg[facility] Fogaanta: 2.46 miles      Qof ahaan   [address] [place] [address]  Luuqad: Soha Allen, Katelyn, Soomaali  Saacadaha: Isniinta - Sabradha 5:00 GH - 9:00 GD  Kharashyada: Carroll Hoff   Phone: [phone] Email: info@Sweeten Website: http://www.Sweeten/     15  Ishmaeldiidka [facility] Fogaanta: 4.98 miles      [address] [place] [address]  Springupadminiad: Soha Walker: Maximiliano Corey 6:30 GH - 4:30 GD  Rafy: Carroll Hoff   Phone: [phone] Email: LsecyzjfxJzcxdezybvletc88@[employer].com Website: https://[facility] 1[facility] Health          Roslyn parsons & Rasheed Banerjee   915  Akua Maldonado   311  Semaj Lynch   [phone]  Lifeline  alexandra pride   [phone] (TALK)  Fernandez Thomas   [phone] (4-A-Child)  Fernandez Hill   [phone] (HOPE)  [facility] Runaway Safeline   [phone] (RUNAWAY)  Fernandez Krishnan   [phone]  Gomez Acevedo   [phone] (HELP)

## 2024-02-01 NOTE — COMMUNITY RESOURCES LIST (PATIENT PREFERRED LANGUAGE)
02/01/2024   [facility]  N/A  Francotara shelly rowley , douglas crump  sree aviles.  Phone: [phone]   Email: N/A   Address: [facility] [place] [address]   Hours: N/A        Fernandez Paez - Devinibaatada sarahriyaquelin  1  [facility] (Xafiiskmelissa Torres) Fogaanta: 6.21 miles      Telefoon/Virtual   [address] [place] [address]  Luuqad: Ingkojois  Raulitocadaha: Isniinta - Axad Furan 24 [facility]   Phone: [phone] Email: info@[facility].org Website: http://ZelnasSt[facility].org     2  [facility] Fogaanta: 12.91 miles      Telefoon/Virtual   [address] [place] [address]  Luuqad: Ingiriis  Saacadaha: Isniinta - Axad Furan 24 [facility]   Phone: [phone] Email: info@[facility].org Website: http://www.[facility].org          Bryce morejon [facility] Medical Centernicolexiludwig  3  Stephani Araya [facility]) - Adeegyajamarcus Wu Fogaanta: 13 miles      Qof ahaan   [address] [place] [address]  Luuqad: Ingiriis  Raulitocadaha: Isniinta - Jimheather 9:00 GH - 5:00 GD  Rafy: Hanna   Phone: [phone] Email: housing@[facility].org Website: http://www.[facility].org/housing     4  Roberta Araya [facility] Fogaanta: 15.3 miles      Qof ahaan, Telefoon/Virtual   [address] [place] [address]  Springuqad: Soha Walker: Maximiliano Corey 8:30 GH - 4:30 GD  Rafy: Hanna   Phone: [phone] Email: info@[facility].org Website: https://www.[facility].org/locations/[facility] [place]/     Jamir varghese  5  Audrey Barger Fogaanta: 13.39 miles      [facility]   [address] [place] [address]  Springuqad: Soha Walker: Maximiliano Corey 12:00 GD - 3:00 GD  Rafy: Hanna   Phone: [phone] Email:  peaTopDown Conservationcommunity@[employer].com Website: http://peaceSeeOn.org/     6  Hay'adaha Katooliga ee [facility] - Xarunta Fursadaha Fogaanta: 13.55 miles      Qof ahaan   [address] [place] [address]  Luuqad: Ren Hoyos, Sojeffyali  Saacadaha: Isniinta - Sabti 7:00 GH - 3:00 GD  Kharashyada: Suyapa Ferreira Edwardoanjelica   Phone: [phone] Email: info@[employer] Website: https://www.[employer].org/locations/opportunity-center/     Radha may  7  [facility] nicolle [facility] Barnaamiradhaa IibLake Martin Community Hospitala NadeemPeaceHealth Southwest Medical Centersaravanan Fogaanta: 3.76 miles      Telefoon/Virtual   [address] [place] [address]  Luuqad: Rebasuyapa Cabezascadaha: Isdaniellaa - Jimheather 8:00 GH - 4:30 GD  Kharashyada: Saúlaash   Phone: [phone] Email: hra@[facility] Website: https://www.[facility]/hra/[place]-housing-and-grnpcypswwpxh-sgiphpwak-dey     8  [facility] HospitalooanitaProvidence [facility] Medical Center - [facility] - Saravananalkenzie Wu Onbarbara [facility] Agustinadinkathryn Fogaanta: 12.07 miles      Telefoon/Virtual   [address] [place] [address]  Luuqad: Ingiriis  Saacadaha: Isniinta - Axad Furan 24 [address]  Kharashyada: Tyson lau   Phone: [phone] Email: angie@[facility] [employer].[facility] Website: https://[place].org/     Philippe qoysaska  9  Hoyga Qoyska ee [facility] Fogaanta: 8.71 miles      Qof ahaan   [address] [place] [address]  Luuqad: Soha Walker: Maximiliano Cornejo Furan 24 [facility]  Rafy: Aubrie Ferreira   Phone: [phone] Ext.1 Email: info@[facility] Rehabilitation HospitalReFashionerSt. Mary's Sacred [facility] Website: http://www.[facility]acon[facility] Rehabilitation HospitalReFashionerSt. Mary's Sacred [facility] Hospital     Philippe canales  10  Lyly Keeqbritt Araya (CAP)  Damien Hernandez & [facility] Fogaanta: 8.98 miles      [facility]   [address] [place] [address]  Luuqad: Ren Hoyos: Maximiliano Corey 8:00 GH - 4:30 GD  Rafy: Hanna   Phone: [phone]  Email: info@capRNDOMN.org Website: http://www.Stylecrook.org     11  Ismiya Keeqabamark Bulmiracle (CAP) Damien Valdez & Carlos Hernandez Fogaanta: 11.21 miles      Qof ahaan   [address] [place] [address]  Luuqad: Soha Isbakat Cabezascadaha: Islayneinta - Jimce 8:00 GH - 4:30 GD  Kharashyada: Bilaash   Phone: [phone] Email: info@[employer].org Website: https://www.Stylecrook.org/          Diaz goddard  12  Akua Linebacker Ronaldo Fogaanta: 4.16 miles      Qof ahaan   [address] [facility] [address] [place] [address]  Luuqad: Soha Walker: Islauren - Leora 7:00 GH - 5:00 GD  Kharashyada: Bilaash   Phone: [phone] Email: Netpulse@[employer].elmenus Website: https://Rapid Diagnostek/     13  Amicus - [facility] [place] Fogaanta: 12.19 miles      Qof ahaan   [address] [place] [address]  Luuqad: Soha Cabezascadaha: Isnicolta - Jimheather 9:00 GH - 12:00 GD , Isnicolta - Jimce 1:00 GD - 3:00 GD  Kharashyada: Is Bixinta   Phone: [phone] Email: info@OurHistree.org Website: https://www.voamnwi.org/[place]     Diaz galdamez  14  Dhaqdhaqaaqa Magaalajamarcus SmithKlickitat Valley Health Fogaanta: 2.46 miles      Qof ahaan   [address] [place] [address]  Luuqad: Soha Allen, Katelyn, Soomaali  Saacadaha: Isniinta - Sabradha 5:00 GH - 9:00 GD  Kharashyada: Carroll Hoff   Phone: [phone] Email: info@Qinti Website: http://www.Qinti/     15  Ishmaeldiidka [facility] Fogaanta: 4.98 miles      [address] [place] [address]  Springupadminiad: Soha Walker: Maximiliano Corey 6:30 GH - 4:30 GD  Rafy: Carroll Hoff   Phone: [phone] Email: XvjqudlcyKwyqyxlozsunla99@[employer].com Website: https://[facility] HospitalNerd Kingdom          Roslyn parsons & Rasheed Banerjee   916  Akua Maldonado   311  Semaj Lynch   [phone]  Lifeline  alexandra pride   [phone] (TALK)  Fernandez Thomas   [phone] (4-A-Child)  Fernandez Hill   [phone] (HOPE)  [place] Runaway Safeline   [phone] (RUNAWAY)  Fernandez Krishnan   [phone]  Gomez Acevedo   [phone] (HELP)

## 2024-02-01 NOTE — COMMUNITY RESOURCES LIST (PATIENT PREFERRED LANGUAGE)
02/01/2024   [facility] [place]  N/A  Francotara shelly rowley , douglas crump  sree aviles.  Phone: [phone]   Email: N/A   Address: [facility] [place] [address]   Hours: N/A        Fernandez Paez - Devinibaatada sarahriyaquelin  1  [facility] (Xafiiskmelissa Torres) Fogaanta: 4.16 miles      Telefoon/Virtual   [address] [place] [address]  Luuqad: Rebais  Careydaha: Isniinta - Axad Furan 24 [facility]   Phone: [phone] Email: info@[facility].org Website: http://[facility].org     2  [facility] Fogaanta: 11.21 miles      Telefoon/Virtual   [address] [place] [address]  Luuqad: Ingiriis  Saacadaha: Isniinta - Axad Furan 24 [facility]   Phone: [phone] Email: info@[facility].org Website: http://www.[facility].org          Bryce morejon [facility] MedCenterxiludwig  3  [facility]) - Adeegyada Bryce Fogaanta: 12.07 miles      Qof ahaan   [address] [place] [address]  Luuqad: Rebais  Raulitocadaha: Isniinta - Jimheather 9:00 GH - 5:00 GD  Rafy: Hanna   Phone: [phone] Email: housing@[facility].org Website: http://www.[facility].org/housing     4  Roberta Araya [facility] Down[facility] Fogaanta: 13 miles      Qof ahaan, Telefoon/Virtual   [address] [place] [address]  Springuqad: Soha Walker: Maximiliano Corey 8:30 GH - 4:30 GD  Rafy: Hanna   Phone: [phone] Email: info@[facility].org Website: https://www.[facility].org/locations/[facility]/     Jamir varghese  5  Audrey Barger Fogaanta: 12.19 miles      [facility]   [address] [place] [address]  Springuqad: Soha Walker: Maximiliano Corey 12:00 GD - 3:00 GD  Rafy: Hanna   Phone: [phone] Email:  peaCoinPasscommunity@[facility] Campus.com Website: http://peacePostPath.org/     6  Hay'adaha Katooliga ee [facility] Xarunta Fursadaha Fogaanta: 12.91 miles      Qof ahaan   [address] [place] [address]  Luuqad: Ren Hoyos, Sojeffyali  Saacadaha: Isniinta - Sabti 7:00 GH - 3:00 GD  Kharashyada: Suyapa Ferreira Edwardoanjelica   Phone: [phone] Email: info@[employer] Website: https://www.[employer].org/locations/opportunity-center/     Radha may  7  [facility] duncanada [facility] Barnaamijka IibHeart of America Medical Centeraaa NadeemNew Wayside Emergency Hospitalsaravanan Fogaanta: 3.76 miles      Telefoon/Virtual   [address] [place] [address]  Luuqad: Rebasuyapa Cabezascadaha: Isdaniellaa - Jimheather 8:00 GH - 4:30 GD  Kharashyada: Saúlaash   Phone: [phone] Email: hra@[facility] Website: https://www.[facility]/hra/[place]-housing-and-odgmqbpdnnrqm-ylwywpabt-ltc     8  [facility] - Saravananalmajamarcus Wu Onbarbara [facility] Agustinadinkathryn Fogaanta: 8.98 miles      Telefoon/Virtual   [address] [place] [address]  Luuqad: Ingiriis  Saacadaha: Isniinta - Axad [address]  Kharashyada: Tyson lau   Phone: [phone] Email: angie@Xcalia.Medikal.com Website: https://Xcalia.org/     Philippe washingtonoysaska  9  Hoyga Qoyska ee [facility] Fogaanta: 4.98 miles      Qof ahaan   [address] [place] [address]  Luuqad: Soha Walker: Maximiliano Cornejo Furan 24 [facility]  Rafy: Aubrie Ferreira   Phone: [phone] Ext.1 Email: info@[facility] Website: http://www.[facility] Northwest IndianaKEMOJO TruckingWellstar Spalding Regional Hospital     Philippe canales  10  Lyly Araya (CAP)  Damien Hernandez & [facility] Fogaanta: 6.21 miles      [facility]   [address] [place] [address]  Luuqad: Ren Hoyos: Maximiliano Corey 8:00 GH - 4:30 GD  Rafy: Hanna   Phone: [phone] Email:  info@capagency.org Website: http://www.capagenCmune.org     11  Ismiya Keeqabagarya Bulmiracle (CAP) Damien Valdez & Carlos Hernandez Fogaanta: 8.71 miles      Qof ahaan   [address] [place] [address]  Luuqad: Soha Isbakat  Saacadaha: Isniinta - Jimce 8:00 GH - 4:30 GD  Kharashyada: Bilaash   Phone: [phone] Email: info@Prelert.org Website: https://www.Prelert.org/          Diaz hall  sree goddard  12  Akua Needbox ASEJPr[employer] Fogaanta: 13.39 miles      Qof ahaan   [address] [facility] [address] [place] [address]  Luuqad: Soha Rezaha: Isdaniellaa - Leora 7:00 GH - 5:00 GD  Kharashyada: Bilaash   Phone: [phone] Email: Utan@Vayyar.Sonnedix Website: https://Delphix/     13  Amicus - [facility] [place] Fogaanta: 15.3 miles      Qof ahaan   [address] [place] [address]  Luuqad: Soha  Saacadaha: Isniinta - Jimce 9:00 GH - 12:00 GD , Isnicolta - Jimce 1:00 GD - 3:00 GD  Kharashyada: Is Bixinta   Phone: [phone] Email: info@[employer].SIMI Website: https://www.voamnwi.org/[place]     Diaz galdamez  14  Dhaqdhaqaaqa Magaalada Mat[place]io Fogaanta: 2.46 miles      Qof ahaan   [address] [place] [address]  Luuqad: Soha Allen, Oromo, Soomaali  Saacadaha: Isniinta - Sabradha 5:00 GH - 9:00 GD  Kharashyada: Carroll Hoff   Phone: [phone] Email: info@Skilljar Website: http://www.Skilljar/     15  Ariannaidka [facility] Fogaanta: 13.55 miles      [address] [place] [address]  Springupadminiad: Soha Walker: Maximiliano Corey 6:30 GH - 4:30 GD  Rafy: Carroll Hoff   Phone: [phone] Email: JvvgnpobbUraghygxerfufr34@Vayyar.Sonnedix Website: https://[facility] SystemTrackTik.Sonnedix          Roslyn parsons & Rasheed Banerjee   911  Akua Maldonado   311  Semaj Lynch   [phone]  Lifeline  cole pride   [phone] (TALK)  Fernandez Thomas   [phone] (4-A-Child)  Fernandez Hill   [phone] (HOPE)  [employer] Runaway Safeline   [phone] (RUNAWAY)  Fernandez Krishnan   [phone]  Gomez Acevedo   [phone] (HELP)     CBC, BMP

## 2024-02-01 NOTE — COMMUNITY RESOURCES LIST (ENGLISH)
02/01/2024   [facility]  N/A  For questions about this resource list or additional care needs, please contact your primary care clinic or care manager.  Phone: [phone]   Email: N/A   Address: [address] [place] [address]   Hours: N/A        Hotlines and Helplines       Hotline - Housing crisis  1  [facility] (Main Office) Distance: 6.21 miles      Phone/Virtual   [address] [place] [address]  Language: English  Hours: Mon - Sun Open 24 Hours   Phone: (770) 194-3367 Email: info@Blade Games World.ObjectLabs Website: http://Blade Games World.ObjectLabs     2  [facility] Distance: 12.91 miles      Phone/Virtual   [address] [place] [address]  Language: English  Hours: Mon - Sun Open 24 Hours   Phone: (568) 218-2333 Email: info@[employer]ade [employer].ObjectLabs Website: http://www.Blade [employer] World.org          Housing       Coordinated Entry access point  3  [facility] [place] ([facility] - Housing Services Distance: 13 miles      In-Person   [address] [place] [address]  Language: English  Hours: Mon - Fri 9:00 AM - 5:00 PM  Fees: Free   Phone: (351) 890-8471 Email: housing@[facility].org Website: http://www.[facility].org/housing     4  [facility] Distance: 15.3 miles      In-Person, Phone/Virtual   [address] [place] [address]  Language: English  Hours: Mon - Fri 8:30 AM - 4:30 PM  Fees: Free   Phone: (450) 609-4755 Email: info@[facility].org Website: https://www.[facility].org/locations/[facility]/     Drop-in center or day shelter  5  [facility] Distance: 13.39 miles      In-Person   [address] [place] [address]  Language: English  Hours: Mon - Fri 12:00 PM - 3:00 PM  Fees: Free   Phone: (614) 853-7859 Email: VoiceBunny@Thundersoft.Lumidigm Website: http://VoiceBunny.org/     6  [facility] Distance: 13.55 miles       In-Person   [address] [place] [address]  Language: English, American, Paraguayan  Hours: Mon - Sat 7:00 AM - 3:00 PM  Fees: Free, Self Pay   Phone: (409) 617-3114 Email: info@[employer] Science Website: https://www.Neredekal.com.ShipEarly/locations/opportunity-center/     Housing search assistance  7  [facility] - Rental Homes for Future Homebuyers Program Distance: 3.76 miles      Phone/Virtual   [address] [place] [address]  Language: English  Hours: Mon - Fri 8:00 AM - 4:30 PM  Fees: Free   Phone: (400) 511-8751 Email: hra@[facility] Website: https://www.[facility]/hra/[place]-housing-and-ldbddsrvyjqnb-rwneytfnb-rjx     8  [facility] - Online Housing Search Assistance Distance: 12.07 miles      Phone/Virtual   [address] [place] [address]  Language: English  Hours: Mon - Sun Open 24 Hours  Fees: Free   Phone: (632) 513-5331 Email: findhoushiraz@BeOnDesk Website: https://ClaroCropwell.ShipEarly/     Shelter for families  9  [facility] Family [facility]ter Distance: 8.71 miles      In-Person   [address] [place] [address]  Language: English  Hours: Mon - Sun Open 24 Hours  Fees: Free, Sliding Fee   Phone: (932) 270-6055 Ext.1 Email: info@[facility]waukeeTripbirds.ShipEarly Website: http://www.[facility].org     Shelter for individuals  10  Community Action Partnership (CAP) of [facility], & [facility] Distance: 8.98 miles      In-Person   [address] [place] [address]  Language: English, Paraguayan  Hours: Mon - Fri 8:00 AM - 4:30 PM  Fees: Free   Phone: (142) 432-1468 Email: info@[facility] Hospital[facility].org Website: http://www.[facility] HospitalYou[facility].org     11  Community Action Partnership (CAP) of Damien Gordon & [facility] [place] Distance: 11.21 miles      In-Person   [address] [place] [address]  Language: English, Paraguayan  Hours: Mon - Fri 8:00 AM - 4:30 PM  Fees: Free   Phone: (698)  426-7691 Email: info@[employer]ur [employer] Management Corporation.Spiceworks Website: https://www.[employer] Corporation.org/          Transportation       Free or low-cost transportation  12  [employer]. Distance: 4.16 miles      In-Person   [address] [facility] [address] [place] [address]  Language: English  Hours: Mon - Fri 7:00 AM - 5:00 PM  Fees: Free   Phone: (514) 184-7180 Email: xlugbdzvuyoc77@3DMGAME Website: https://PresentationTube/     13  [facility] - [place] Distance: 12.19 miles      In-Person   [address] [place] [address]  Language: English  Hours: Mon - Fri 9:00 AM - 12:00 PM , Mon - Fri 1:00 PM - 3:00 PM  Fees: Self Pay   Phone: (710) 725-1314 Email: info@zkipster Website: https://www.TripleLift.org/[place]     Transportation to medical appointments  14  [employer] Distance: 2.46 miles      In-Person   [address] [place] [address]  Language: Indonesian, English, Oromo, Salvadorean  Hours: Mon - Sat 5:00 AM - 9:00 PM  Fees: Insurance, Self Pay   Phone: (771) 800-8833 Email: info@Solavista Website: http://www.Grapevine Talk.Tuscany Gardens/     15  [facility] Transportation Distance: 4.98 miles      [address] [place] [address]  Language: English  Hours: Mon - Fri 6:30 AM - 4:30 PM  Fees: Insurance, Self Pay   Phone: (750) 430-1992 Email: WwokqbchaJglfxeqrkadpye68@3DMGAME Website: https://Choister.Tuscany Gardens          Important Numbers & Websites       Emergency Services   911  City Services   311  Poison Control   (349) 404-9739  Suicide Prevention Lifeline   (978) 475-4434 (TALK)  Child Abuse Hotline   (622) 215-1977 (4-A-Child)  Sexual Assault Hotline   (636) 243-2134 (HOPE)  National Runaway Safeline   (156) 770-7161 (RUNAWAY)  All-Options Talkline   (638) 349-9979  Substance Abuse Referral   (898) 675-5515 (HELP)

## 2024-02-01 NOTE — COMMUNITY RESOURCES LIST (ENGLISH)
02/01/2024   [facility]  N/A  For questions about this resource list or additional care needs, please contact your primary care clinic or care manager.  Phone: [phone]   Email: N/A   Address: [address] [place] [address]   Hours: N/A        Hotlines and Helplines       Hotline - Housing crisis  1  [facility] (Main Office) Distance: 6.21 miles      Phone/Virtual   [address] [place] [address]  Language: English  Hours: Mon - Sun Open 24 Hours   Phone: (438) 508-2266 Email: info@[employer]GAIN Fitness Website: http://[employer]..GAIN Fitness     2  [facility] Distance: 12.91 miles      Phone/Virtual   [address] [place] [address]  Language: English  Hours: Mon - Sun Open 24 Hours   Phone: (234) 692-7159 Email: info@[employer]GAIN Fitness Website: http://www.[employer]..org          Housing       Coordinated Entry access point  3  [facility] [place] ([facility] - Housing Services Distance: 13 miles      In-Person   [address] [place] [address]  Language: English  Hours: Mon - Fri 9:00 AM - 5:00 PM  Fees: Free   Phone: (332) 795-9281 Email: housing@[facility].org Website: http://www.[facility].org/housing     4  [facility] Distance: 15.3 miles      In-Person, Phone/Virtual   [address] [place] [address]  Language: English  Hours: Mon - Fri 8:30 AM - 4:30 PM  Fees: Free   Phone: (887) 493-9861 Email: info@[facility].org Website: https://www.[facility].org/locations/[facility]/     Drop-in center or day shelter  5  [facility] Distance: 13.39 miles      In-Person   [address] [place] [address]  Language: English  Hours: Mon - Fri 12:00 PM - 3:00 PM  Fees: Free   Phone: (127) 730-9110 Email: Milabra@Certica Solutions.Christini Technologies Website: http://Milabra.org/     6  [facility] Distance: 13.55 miles       In-Person   [address] [place] [address]  Language: English, Fijian, Luxembourger  Hours: Mon - Sat 7:00 AM - 3:00 PM  Fees: Free, Self Pay   Phone: (869) 802-8132 Email: info@Future Fleet Website: https://www.ONOSYS Online Ordering.Curb Call/locations/opportunity-center/     Housing search assistance  7  [facility] Redevelopment Authority - Rental Homes for Future Homebuyers Program Distance: 3.76 miles      Phone/Virtual   [address] [place] [address]  Language: English  Hours: Mon - Fri 8:00 AM - 4:30 PM  Fees: Free   Phone: (979) 455-8861 Email: hra@[facility] Website: https://www.[facility]/hra/[place]-housing-and-ixinbhjxhykhg-bnujndecr-owu     8  [facility] - Online Housing Search Assistance Distance: 12.07 miles      Phone/Virtual   [address] [place] [address]  Language: English  Hours: Mon - Sun Open 24 Hours  Fees: Free   Phone: (452) 423-1404 Email: findhoushiraz@Atlassian Website: https://Aoxing PharmaceuticalLincoln.Curb Call/     Shelter for families  9  [facility] Family Shelter Distance: 8.71 miles      In-Person   [address] [place] [address]  Language: English  Hours: Mon - Sun Open 24 Hours  Fees: Free, Sliding Fee   Phone: (976) 652-2191 Ext.1 Email: info@[facility] HospitalInsureWorxDrewPersonal Web Systems.Curb Call Website: http://www.[facility].org     Shelter for individuals  10  Community Action Partnership (CAP) of [facility], & [facility] Distance: 8.98 miles      In-Person   [address] [place] [address]  Language: English, Luxembourger  Hours: Mon - Fri 8:00 AM - 4:30 PM  Fees: Free   Phone: (986) 231-5767 Email: info@[facility].org Website: http://www.[facility].org     11  Community Action Partnership (CAP) of Damien Gordon & [facility] [place] Distance: 11.21 miles      In-Person   [address] [place] [address]  Language: English, Luxembourger  Hours: Mon - Fri 8:00 AM - 4:30 PM  Fees: Free   Phone: (039)  830-6068 Email: info@BioConsortia.Peopleclick Authoria Website: https://www.BioConsortia.org/          Transportation       Free or low-cost transportation  12  [employer]. Distance: 4.16 miles      In-Person   [address] [facility] [address] [place] [address]  Language: English  Hours: Mon - Fri 7:00 AM - 5:00 PM  Fees: Free   Phone: (284) 320-8819 Email: adxjtuuwnhgz07@[employer] Corporation Website: https://NuORDER/     13  [facility] [place] [facility] [place] Distance: 12.19 miles      In-Person   [address] [place] [address]  Language: English  Hours: Mon - Fri 9:00 AM - 12:00 PM , Mon - Fri 1:00 PM - 3:00 PM  Fees: Self Pay   Phone: (867) 678-1539 Email: info@Common Ground Website: https://www.Footmarks.org/[place]     Transportation to medical appointments  14  [place] [facility] Distance: 2.46 miles      In-Person   [address] [place] [address]  Language: Lao, English, Oromo, Tanzanian  Hours: Mon - Sat 5:00 AM - 9:00 PM  Fees: Insurance, Self Pay   Phone: (720) 839-4285 Email: info@Arradiance Website: http://www.Decorative Hardware Inc.FriendsEAT/     15  [facility] Transportation Distance: 4.98 miles      [address] [place] [address]  Language: English  Hours: Mon - Fri 6:30 AM - 4:30 PM  Fees: Insurance, Self Pay   Phone: (657) 254-9844 Email: ClifsboqvIbwxrxykrmjvat19@[employer] Physics Corporation Website: https://Eventtus.FriendsEAT          Important Numbers & Websites       Emergency Services   911  City Services   311  Poison Control   (709) 212-6981  Suicide Prevention Lifeline   (821) 127-7994 (TALK)  Child Abuse Hotline   (391) 244-9371 (4-A-Child)  Sexual Assault Hotline   (438) 875-2973 (HOPE)  National Runaway Safeline   (400) 985-1260 (RUNAWAY)  All-Options Talkline   (581) 152-9158  Substance Abuse Referral   (554) 644-9209 (HELP)

## 2024-02-02 DIAGNOSIS — R79.89 ELEVATED TSH: Primary | ICD-10-CM

## 2024-02-02 LAB
ALBUMIN SERPL BCG-MCNC: 4 G/DL (ref 3.5–5.2)
ALP SERPL-CCNC: 89 U/L (ref 40–150)
ALT SERPL W P-5'-P-CCNC: 20 U/L (ref 0–50)
ANION GAP SERPL CALCULATED.3IONS-SCNC: 11 MMOL/L (ref 7–15)
AST SERPL W P-5'-P-CCNC: 22 U/L (ref 0–45)
BILIRUB SERPL-MCNC: 0.3 MG/DL
BUN SERPL-MCNC: 11.9 MG/DL (ref 8–23)
CALCIUM SERPL-MCNC: 9.6 MG/DL (ref 8.8–10.2)
CHLORIDE SERPL-SCNC: 107 MMOL/L (ref 98–107)
CREAT SERPL-MCNC: 0.69 MG/DL (ref 0.51–0.95)
DEPRECATED HCO3 PLAS-SCNC: 24 MMOL/L (ref 22–29)
EGFRCR SERPLBLD CKD-EPI 2021: >90 ML/MIN/1.73M2
GLUCOSE SERPL-MCNC: 99 MG/DL (ref 70–99)
POTASSIUM SERPL-SCNC: 4.1 MMOL/L (ref 3.4–5.3)
PROT SERPL-MCNC: 7.6 G/DL (ref 6.4–8.3)
SODIUM SERPL-SCNC: 142 MMOL/L (ref 135–145)
T4 FREE SERPL-MCNC: 1.16 NG/DL (ref 0.9–1.7)
TSH SERPL DL<=0.005 MIU/L-ACNC: 6.42 UIU/ML (ref 0.3–4.2)
VIT D+METAB SERPL-MCNC: 33 NG/ML (ref 20–50)

## 2024-02-09 ENCOUNTER — TRANSCRIBE ORDERS (OUTPATIENT)
Dept: OTHER | Age: 64
End: 2024-02-09

## 2024-02-09 DIAGNOSIS — J31.0 CHRONIC RHINITIS: Primary | ICD-10-CM

## 2024-02-09 DIAGNOSIS — M54.6 BILATERAL THORACIC BACK PAIN, UNSPECIFIED CHRONICITY: Primary | ICD-10-CM

## 2024-02-15 NOTE — TELEPHONE ENCOUNTER
FUTURE VISIT INFORMATION      FUTURE VISIT INFORMATION:  Date: 4/30/24  Time: 2 PM  Location: [facility]  REFERRAL INFORMATION:  Referring provider:    Referring providers clinic:    Reason for visit/diagnosis:  per [facility] chronic rhinitis confirmed [facility]     RECORDS REQUESTED FROM      Clinic name Comments Records Status Imaging Status   [facility]  2/8/24 referral/ Notes -Vandana Kendrick MD    6/13/23 notes- David Saab, HERMINIA, FNP-C   CE     Health Imaging 4/4/18 MR brain Epic PACS

## 2024-03-05 ENCOUNTER — APPOINTMENT (OUTPATIENT)
Dept: INTERPRETER SERVICES | Facility: CLINIC | Age: 64
End: 2024-03-05
Payer: COMMERCIAL

## 2024-03-06 ENCOUNTER — THERAPY VISIT (OUTPATIENT)
Dept: PHYSICAL THERAPY | Facility: CLINIC | Age: 64
End: 2024-03-06
Attending: FAMILY MEDICINE
Payer: COMMERCIAL

## 2024-03-06 DIAGNOSIS — M54.6 BILATERAL THORACIC BACK PAIN, UNSPECIFIED CHRONICITY: Primary | ICD-10-CM

## 2024-03-06 PROCEDURE — 97110 THERAPEUTIC EXERCISES: CPT | Mod: GP | Performed by: PHYSICAL THERAPIST

## 2024-03-06 PROCEDURE — 97161 PT EVAL LOW COMPLEX 20 MIN: CPT | Mod: GP | Performed by: PHYSICAL THERAPIST

## 2024-03-06 NOTE — PROGRESS NOTES
PHYSICAL THERAPY EVALUATION  Type of Visit: Evaluation    See electronic medical record for Abuse and Falls Screening details.    Subjective   Pt is a 64 year old female presenting with complaints of B shoulder and upper back/thoracic pain.   The symptoms started 2 months ago with no SHEILA. She first suffered from constipation which led rto reflux and then this pain started.   Pt describes the pain as heavy and burning.   Prior treatments: PT, acupuncure, massage about 5 years ago  The resulting functional limitations include kneeling down to pray, excessive fatigue after activity such as walking.   Pain is better with some stretching, pain meds   Sleep Quality: variable due to the pain.   Current level of activity:   Goals of therapy: reduce fatigue, reach for objects        Presenting condition or subjective complaint: shoulder pain due to weight gain  Date of onset: 01/06/24    Relevant medical history: Arthritis; Asthma; High blood pressure; Menopause; Overweight; Rheumatoid arthritis; Severe headaches; Significant weakness; Sleep disorder like apnea   Dates & types of surgery: 2 surgery, knee surgery    Prior diagnostic imaging/testing results:       Prior therapy history for the same diagnosis, illness or injury: Yes PT, massage, acupuncture 5 years ago    Prior Level of Function  Transfers:   Ambulation:   ADL:   IADL:     Living Environment  Social support: Alone   Type of home: Apartment/condo   Stairs to enter the home: No       Ramp: No   Stairs inside the home:         Help at home: Self Cares (home health aide/personal care attendant, family, etc)  Equipment owned:       Employment: No    Hobbies/Interests:      Patient goals for therapy: I am unable to liift weight, can't exercise    Pain assessment: Pain present     Objective       CERVICAL SPINE EVALUATION    PAIN: Pain Level at Rest: 5/10  Pain Level with Use: 10/10  Pain Quality: Burning and heavy  Pain Frequency: constant  Pain is Worst: morning      POSTURE: rounded shoulders     ROM:   (Degrees) Left AROM Right AROM    Cervical Flexion Min*    Cervical Extension Mod*    Cervical Side bend Min  Min     Cervical Rotation Min  Mod     Cervical Protrusion     Cervical Retraction     Thoracic Flexion     Thoracic Extension Tight     Thoracic Rotation Tight  Tight      Left AROM Left PROM Right AROM Right PROM   Shoulder Flexion Lacking ~50 deg to full  Lacking ~50 deg to full    Shoulder Extension       Shoulder Abduction       Shoulder Adduction Lacking ~50 deg to full  Lacking ~50 deg to full    Shoulder IR To L5  To L5    Shoulder ER       Shoulder Horiz Abduction       Shoulder Horiz Adduction         MYOTOMES: Overall weakness but no concern for myotomal specific weakness- more deconditioning    Left Right   C1-2 (Neck Flexion)     C3 (Neck Side Bend)      C4 (Shrug)     C5 (Deltoid) 3+ 3+   C6 (Biceps) 3+ 3+   C7 (Triceps) 3+ 3+   C8 (Thumb Ext) 3+ 3+   T1 (Intrinsics) 5 5       Functional Strength:  -Seated Scapular Retraction: UT compensation - needed consistent cuing       Assessment & Plan   CLINICAL IMPRESSIONS  Medical Diagnosis: Bilateral thoracic back pain, unspecified chronicity    Treatment Diagnosis: B shoulder and back pain   Impression/Assessment: Patient is a 64 year old female with widespread pain in B shoulders and upper-mid back.  The following significant findings have been identified: Pain, Decreased ROM/flexibility, Decreased strength, Impaired balance, Impaired muscle performance, Decreased activity tolerance, Impaired posture, and Instability. These impairments interfere with their ability to perform self care tasks, household chores, and community mobility as compared to previous level of function.     Clinical Decision Making (Complexity):  Clinical Presentation: Stable/Uncomplicated  Clinical Presentation Rationale: based on medical and personal factors listed in PT evaluation  Clinical Decision Making (Complexity): Low  complexity    PLAN OF CARE  Treatment Interventions:  Interventions: Gait Training, Manual Therapy, Neuromuscular Re-education, Therapeutic Activity, Therapeutic Exercise, Self-Care/Home Management    Long Term Goals     PT Goal 1  Goal Identifier: Prayers  Goal Description: Pt will be able to get into proper positions for daily prayers  Rationale: to maximize safety and independence with self cares;to maximize safety and independence with performance of ADLs and functional tasks  Target Date: 05/29/24  PT Goal 2  Goal Identifier: Fatigue  Goal Description: Pt will be able to complete physical activity such as walking, exercise, chores etc without pain or excessive fatigue  Rationale: to maximize safety and independence with performance of ADLs and functional tasks;to maximize safety and independence within the home;to maximize safety and independence with self cares  Target Date: 05/29/24      Frequency of Treatment: 1x/week  Duration of Treatment: 12 weeks    Recommended Referrals to Other Professionals:   Education Assessment:   Learner/Method: Patient  Education Comments: Eager to participate in therapy    Risks and benefits of evaluation/treatment have been explained.   Patient/Family/caregiver agrees with Plan of Care.     Evaluation Time:     PT Eval, Low Complexity Minutes (34181): 20       Signing Clinician: Jigna Alexander, PT      [facility]                                                                                   OUTPATIENT PHYSICAL THERAPY      PLAN OF TREATMENT FOR OUTPATIENT REHABILITATION   Patient's Last Name, First Name, Alessandra Robison YOB: 1960   Provider's Name   [facility]   Medical Record No.  [id number]     Onset Date: 01/06/24  Start of Care Date: 03/06/24     Medical Diagnosis:  Bilateral thoracic back pain, unspecified chronicity      PT Treatment Diagnosis:  B shoulder and back pain Plan of  Treatment  Frequency/Duration: 1x/week/ 12 weeks    Certification date from 03/06/24 to 05/29/24         See note for plan of treatment details and functional goals     Jigna Alexander, PT                         I CERTIFY THE NEED FOR THESE SERVICES FURNISHED UNDER        THIS PLAN OF TREATMENT AND WHILE UNDER MY CARE .             Physician Signature               Date    X_____________________________________________________                  Referring Provider:  Vandana Kendrick    Initial Assessment  See Epic Evaluation- Start of Care Date: 03/06/24

## 2024-03-28 ENCOUNTER — THERAPY VISIT (OUTPATIENT)
Dept: PHYSICAL THERAPY | Facility: CLINIC | Age: 64
End: 2024-03-28
Payer: COMMERCIAL

## 2024-03-28 DIAGNOSIS — M54.6 BILATERAL THORACIC BACK PAIN, UNSPECIFIED CHRONICITY: Primary | ICD-10-CM

## 2024-03-28 PROCEDURE — 97140 MANUAL THERAPY 1/> REGIONS: CPT | Mod: GP | Performed by: PHYSICAL THERAPIST

## 2024-03-28 PROCEDURE — 97110 THERAPEUTIC EXERCISES: CPT | Mod: GP | Performed by: PHYSICAL THERAPIST

## 2024-04-02 ENCOUNTER — OFFICE VISIT (OUTPATIENT)
Dept: INTERNAL MEDICINE | Facility: CLINIC | Age: 64
End: 2024-04-02
Payer: COMMERCIAL

## 2024-04-02 VITALS
DIASTOLIC BLOOD PRESSURE: 76 MMHG | HEIGHT: 64 IN | SYSTOLIC BLOOD PRESSURE: 120 MMHG | RESPIRATION RATE: 16 BRPM | HEART RATE: 82 BPM | WEIGHT: 154.3 LBS | TEMPERATURE: 97.4 F | BODY MASS INDEX: 26.34 KG/M2 | OXYGEN SATURATION: 98 %

## 2024-04-02 DIAGNOSIS — R79.89 ELEVATED TSH: ICD-10-CM

## 2024-04-02 DIAGNOSIS — Z91.81 AT HIGH RISK FOR FALLS: ICD-10-CM

## 2024-04-02 DIAGNOSIS — Z12.11 SCREEN FOR COLON CANCER: ICD-10-CM

## 2024-04-02 DIAGNOSIS — M25.561 CHRONIC PAIN OF RIGHT KNEE: ICD-10-CM

## 2024-04-02 DIAGNOSIS — Z11.59 NEED FOR HEPATITIS C SCREENING TEST: ICD-10-CM

## 2024-04-02 DIAGNOSIS — G89.29 CHRONIC PAIN OF RIGHT KNEE: ICD-10-CM

## 2024-04-02 DIAGNOSIS — Z00.00 ROUTINE GENERAL MEDICAL EXAMINATION AT A HEALTH CARE FACILITY: Primary | ICD-10-CM

## 2024-04-02 DIAGNOSIS — Z11.4 SCREENING FOR HIV (HUMAN IMMUNODEFICIENCY VIRUS): ICD-10-CM

## 2024-04-02 DIAGNOSIS — K21.9 GASTROESOPHAGEAL REFLUX DISEASE, UNSPECIFIED WHETHER ESOPHAGITIS PRESENT: ICD-10-CM

## 2024-04-02 PROCEDURE — 84439 ASSAY OF FREE THYROXINE: CPT | Performed by: INTERNAL MEDICINE

## 2024-04-02 PROCEDURE — 36415 COLL VENOUS BLD VENIPUNCTURE: CPT | Performed by: INTERNAL MEDICINE

## 2024-04-02 PROCEDURE — 80061 LIPID PANEL: CPT | Performed by: INTERNAL MEDICINE

## 2024-04-02 PROCEDURE — 99396 PREV VISIT EST AGE 40-64: CPT | Performed by: INTERNAL MEDICINE

## 2024-04-02 PROCEDURE — 86803 HEPATITIS C AB TEST: CPT | Performed by: INTERNAL MEDICINE

## 2024-04-02 PROCEDURE — 87389 HIV-1 AG W/HIV-1&-2 AB AG IA: CPT | Performed by: INTERNAL MEDICINE

## 2024-04-02 PROCEDURE — 84443 ASSAY THYROID STIM HORMONE: CPT | Performed by: INTERNAL MEDICINE

## 2024-04-02 PROCEDURE — 99213 OFFICE O/P EST LOW 20 MIN: CPT | Mod: 25 | Performed by: INTERNAL MEDICINE

## 2024-04-02 SDOH — HEALTH STABILITY: PHYSICAL HEALTH: ON AVERAGE, HOW MANY DAYS PER WEEK DO YOU ENGAGE IN MODERATE TO STRENUOUS EXERCISE (LIKE A BRISK WALK)?: 3 DAYS

## 2024-04-02 ASSESSMENT — SOCIAL DETERMINANTS OF HEALTH (SDOH): HOW OFTEN DO YOU GET TOGETHER WITH FRIENDS OR RELATIVES?: TWICE A WEEK

## 2024-04-02 ASSESSMENT — PAIN SCALES - GENERAL: PAINLEVEL: NO PAIN (0)

## 2024-04-02 NOTE — COMMUNITY RESOURCES LIST (PATIENT PREFERRED LANGUAGE)
April 2, 2024           SERGO STAHL [facility] ADEEGEDMUND IHERMAN Pacheco       Free - Client Services  [address] [place] [address] (Fogaanta: 14.9 miles)  Jeannine: [phone]  Mareegta: [email or web address] Rehabilitation [email or web address]/  Luuqad: Soha beaver: Hanna Perales: [facility] Link - Housing Stabilization Services  Jeannine: [phone]  Mareegta: https://GeoIQ.Innotech Solar/Housing-Stabilization.html  Luuqad: Soha Walker: Isn 9:00 subaxnimo - 5:00 galabnimo Rory 9:00 subaxnimo - 5:00 galabnimo Arb 9:00 subaxnimo - 5:00 galabnimo Colin 9:00 subaxnimo - 5:00 galabnimo Christiano 9:00 subaxnimo - 5:00 galabnimo  khidmadeja: Luis Eduardo Urias: sally Adams      [place] OF SUZANNE - YOUTH SHELTER  Jeannine: [phone]  Mareegta: https://www.[email or web address]/  Luuqad: Soha Reveles [facility]melissa      Living - Housing Stabilization Services  [address] [place] [address] (Foga[place]: 13.2 miles)  Jeannine: [phone]  Mareegta: https://www.M.T. [employer] Academy  Luuqad: English Allen Somali khidmad: Juan R [place] - Housing Stabilization Services (HSS)  Jeannine: [phone]  Mareegta: https://www.[facility].net/resources  Luuqad: Ren Hoyos: Isn 8:00 subaxnimo - 4:00 galabnimo Rory 8:00 subaxnimo - 4:00 galabnimo Arb 8:00 subaxnimo - 4:00 galabnimo Colin 8:00 subaxnimo - 4:00 galabnimo Christiano 8:00 subaxnimo - 4:00 galabnimo  khidmadeja: Luis Eduardo Ferreira: Louie Tucker Dhegola' ama maqal adag, Adeegyada Turjumajose j      [employer] - Housing Stabilization Services  Jeannine: [phone]  Shelbygta: https://ÃœberResearch.Innotech Solar/  Luuqad: Soha Walker: Isn 8:00 subaxnimo - 4:00 galabnimo Rory 8:00 subaxnimo - 4:00 galabnimo Arb 8:00 subaxnimo - 4:00 galabnimo Colin 8:00  subaxnimo - 4:00 galabnimo Christiano 8:00 subaxnimo - 4:00 galabnimo  khidmad: Bilaash  Helitaanka: Hoy jack porteraanNestorgola ama maqal adag  Xulashada Gaadiidka: Gaadiid bilaash ah    Verónica Gelitaanka [facility] - Warming or cooling center [facility] [place]  [address] [place] [address] ([place]: 1.8 miles)  Jeannine: [phone]  Luuqad: Ren Hoyos Soomaali khidmad: Bilaash  Helitaanka: Adeegyada jairojumaada, Ada la amanda [facility] - Warming or cooling center [facility] [place]  [address] [place] [address] (Fogaanta: 4.8 miles)  Luuqad: Ren Hoyos Ruush khidmad: Bilaash      LOVE - LAUNDRY LOVE  Mareegta: http://www.laundrylove.org               JOSE CHOWDHURYBEABIODUN        Adeviyajamarcus Degdegga   911  .   [facility]  211 [email or web address]  .   Radha cobb  [phone] http://[email or web address] http://wisconsinpoison.org  .     Is-dilid vicki Presley Nomiguel angel Aguilalagita  988 http://[email or web address]  .   Fernandez Prather [facility] Radha [place] ee Qaranka  [place]  [phone] http://ChildVan Wert County Hospitalphotline.org   .   Fernandez Prather  ee Xadgdeanubcole Galmada ee Qaranka  [phone] (RAJO) http://Rainn.org   .     Normabaadajamarcus duckwortha Devan  [phone] (RUNAWAY) http://[facility].org  .   Ajitkathryn Otero  [facility]/text [phone] [place]: http://ppsupportmn.org [place]: http://Visiprise/wi  .   Fernandez Cannonrositaizzy Maandtabitha ([facility])  800-622-HELP (7725) http://Findtreatment.gov   .                AFEEF: Franci villanueva [facility]. Unite  jorden valadez adeeg ee lagjuan manuel walker. Unite  ojrden navarrete in [facility] mario jennings.    [facility]

## 2024-04-02 NOTE — COMMUNITY RESOURCES LIST (ENGLISH)
April 2, 2024           YOUR PERSONALIZED LIST OF SERVICES & PROGRAMS           & SHELTER    Housing      Free - Client Services  [address] [place] [address] (Distance: 14.9 miles)  Phone: [phone]) [phone]  Website: https://Uptake/  Language: English  Fee: Free  Transportation Options: Free transportation      [facility] - Housing Stabilization Services  Phone: (387) 313-1969  Website: https://[employer]/Housing-Stabilization.html  Language: English  Hours: Mon 9:00 AM - 5:00 PM Tue 9:00 AM - 5:00 PM Wed 9:00 AM - 5:00 PM Thu 9:00 AM - 5:00 PM Fri 9:00 AM - 5:00 PM  Fee: Insurance  Accessibility: Deaf or hard of hearing, Translation services      [facility]  Phone: (683) 558-9025  Website: https://www.WhatsOpen.org/  Language: English    Case Management      Living - Housing Stabilization Services  [address] [place] [address] (Distance: 13.2 miles)  Phone: (550) 793-7303  Website: https://Tripwolf  Language: Lao, English, Argentine  Fee: Insurance, Self pay      [facility] [place] - Housing Stabilization Services (HSS)  Phone: (182) 168-7667  Website: https://www.Glam .fr [place].Classana/resources  Language: English, Barbadian  Hours: Mon 8:00 AM - 4:00 PM Tue 8:00 AM - 4:00 PM Wed 8:00 AM - 4:00 PM Thu 8:00 AM - 4:00 PM Fri 8:00 AM - 4:00 PM  Fee: Free, Insurance  Accessibility: Ada accessible, Blind accommodation, Deaf or hard of hearing, Translation services      [employer] - Housing Stabilization Services  Phone: (465) 224-7287  Website: https://homebasemn.com/  Language: English  Hours: Mon 8:00 AM - 4:00 PM Tue 8:00 AM - 4:00 PM Wed 8:00 AM - 4:00 PM Thu 8:00 AM - 4:00 PM Fri 8:00 AM - 4:00 PM  Fee: Free  Accessibility: Blind accommodation, Deaf or hard of hearing  Transportation Options: Free transportation    Drop-In Services      [facility] - Warming or cooling center - [facility]  Library - [facility]  [address] [place] [address] [address] [place] [address] (Distance: 1.8 miles)  Phone: ([phone]) [phone]  Language: English, Anguillan, Mosotho  Fee: Free  Accessibility: Translation services, [facility] - Warming or cooling center - [facility] Library - [place]  [address] [place] [address] (Distance: 4.8 miles)  Language: English, Anguillan, Uzbek  Fee: Free      [facility] - LAUNDRY LOVE  Website: http://www.laundrylove.org               IMPORTANT NUMBERS & WEBSITES        Emergency Services  911  .   United Way  211 http://211unitedway.org  .   Poison Control  (476) 708-3678 http://[place]poison.org http://wisconsinpoison.org  .     Suicide and Crisis Lifeline  988 http://988lifeline.org  .   Childhelp [place] Child Abuse Hotline  244.836.2197 http://Childhelphotline.org   .   [place] Sexual Assault Hotline  (384) 718-3568 (HOPE) http://AtTask.org   .     [place] Runaway Safeline  (661) 147-5760 (RUNAWAY) http://Bolt.Limonetik  .   Pregnancy & Postpartum Support  Call/text 285-816-5217  [place]: http://ppsupportmn.org  [place]: http://ThreatMetrix.com/wi  .   Substance Abuse National Helpline ([facility])  998-787-HELP (7631) http://Findtreatment.gov   .                DISCLAIMER: These resources have been generated via the Helixbind Platform. [employer] does not endorse any service providers mentioned in this resource list. [employer] does not guarantee that the services mentioned in this resource list will be available to you or will improve your health or wellness.    [facility]

## 2024-04-02 NOTE — PROGRESS NOTES
Preventive Care Visit  [facility]  Nilda Dominguez MD, Internal Medicine  Apr 2, 2024      Assessment & Plan     Routine general medical examination at a health care facility  Fasting lab work ordered.  Patient declined on Pap smear and mammogram screening.  Requested on discontinuation of the screening request since patient will not be considering it in the future either.  - Lipid panel reflex to direct LDL Fasting; Future  - Lipid panel reflex to direct LDL Fasting    Gastroesophageal reflux disease, unspecified whether esophagitis present  Symptoms controlled on daily omeprazole medication.  - omeprazole (PRILOSEC) 20 MG DR capsule; Take if no relief from taking pepcid    Chronic pain of knee, bilateral  Would like to consider physical therapy but has concerns of transport.  Care coordination referral placed for resources on transport.  - Physical Therapy  Referral; Future    Elevated TSH  - TSH with free T4 reflex    Screen for colon cancer  - Fecal colorectal cancer screen (FIT); Future  - Fecal colorectal cancer screen (FIT)    Screening for HIV (human immunodeficiency virus)  - HIV Antigen Antibody Combo; Future  - HIV Antigen Antibody Combo    Need for hepatitis C screening test  - Hepatitis C Screen Reflex to HCV RNA Quant and Genotype; Future  - Hepatitis C Screen Reflex to HCV RNA Quant and Genotype    At high risk for falls  Using shower seat which has been very helpful for the patient.  Encouraged to use a cane while ambulating to help reduce the risk of falls.  DME order for the cane was placed but patient reports that this was not covered by insurance.  Recommended to buy a cane over-the-counter at any nearby pharmacy.  - Primary Care - Care Coordination Referral; Future  - Physical Therapy  Referral; Future    Patient has been advised of split billing requirements and indicates understanding: Yes          BMI  Estimated body mass index is 26.49 kg/m  as  "calculated from the following:    Height as of this encounter: 1.626 m (5' 4\").    Weight as of this encounter: 70 kg (154 lb 4.8 oz).       Counseling  Appropriate preventive services were discussed with this patient, including applicable screening as appropriate for fall prevention, nutrition, physical activity, Tobacco-use cessation, weight loss and cognition.  Checklist reviewing preventive services available has been given to the patient.  Reviewed patient's diet, addressing concerns and/or questions.   She is at risk for lack of exercise and has been provided with information to increase physical activity for the benefit of her well-being.   The patient was instructed to see the dentist every 6 months.           Julio Myers is a 64 year old, presenting for the following:  Physical        4/2/2024     8:53 AM   Additional Questions   Roomed by Michi   Accompanied by Daughter and in-person Interrater         Health Care Directive  Patient does not have a Health Care Directive or Living Will: Discussed advance care planning with patient; however, patient declined at this time.    HPI  Due to language barrier, an  in person was present during the history-taking and subsequent discussion (and for part of the physical exam) with this patient.            4/2/2024   General Health   How would you rate your overall physical health? Good   Feel stress (tense, anxious, or unable to sleep) Not at all         4/2/2024   Nutrition   Three or more servings of calcium each day? (!) NO   Diet: Low fat/cholesterol    Carbohydrate counting   How many servings of fruit and vegetables per day? (!) 2-3   How many sweetened beverages each day? 0-1         4/2/2024   Exercise   Days per week of moderate/strenous exercise 3 days         4/2/2024   Social Factors   Frequency of gathering with friends or relatives Twice a week          No data to display                   4/2/2024   Dental   Dentist two times every " "year? (!) NO              Today's PHQ-2 Score:       2/1/2024     8:47 AM   PHQ-2 ( 1999 [employer])   Q1: Little interest or pleasure in doing things 2   Q2: Feeling down, depressed or hopeless 0   PHQ-2 Score 2   Q1: Little interest or pleasure in doing things More than half the days   Q2: Feeling down, depressed or hopeless Not at all   PHQ-2 Score 2         4/2/2024   Substance Use   Alcohol more than 3/day or more than 7/wk No   Do you use any other substances recreationally? No     Social History     Tobacco Use    Smoking status: Never     Passive exposure: Never    Smokeless tobacco: Never   Vaping Use    Vaping Use: Never used   Substance Use Topics    Alcohol use: Never    Drug use: Never          Mammogram Screening - Mammography discussed and declined    History of abnormal Pap smear: discussed and patient declined.       ASCVD Risk   The 10-year ASCVD risk score (Florence HERRERA, et al., 2019) is: 4.5%    Values used to calculate the score:      Age: 64 years      Sex: Female      Is Non- : No      Diabetic: No      Tobacco smoker: No      Systolic Blood Pressure: 120 mmHg      Is BP treated: No      HDL Cholesterol: 55 mg/dL      Total Cholesterol: 208 mg/dL           Reviewed and updated as needed this visit by Provider                          Review of Systems  Constitutional, HEENT, cardiovascular, pulmonary, gi and gu systems are negative, except as otherwise noted.     Objective    Exam  /76 (BP Location: Right arm, Patient Position: Sitting, Cuff Size: Adult Regular)   Pulse 82   Temp 97.4  F (36.3  C) (Tympanic)   Resp 16   Ht 1.626 m (5' 4\")   Wt 70 kg (154 lb 4.8 oz)   LMP  (LMP Unknown)   SpO2 98%   BMI 26.49 kg/m     Estimated body mass index is 26.49 kg/m  as calculated from the following:    Height as of this encounter: 1.626 m (5' 4\").    Weight as of this encounter: 70 kg (154 lb 4.8 oz).    Physical Exam  GENERAL: alert and no distress  EYES: Eyes " grossly normal to inspection, PERRL and conjunctivae and sclerae normal  HENT: ear canals and TM's normal, nose and mouth without ulcers or lesions  NECK: no adenopathy, no asymmetry, masses, or scars  RESP: lungs clear to auscultation - no rales, rhonchi or wheezes  BREAST: normal without masses, tenderness or nipple discharge and no palpable axillary masses or adenopathy  CV: regular rate and rhythm, normal S1 S2  ABDOMEN: soft, nontender, no hepatosplenomegaly, no masses and bowel sounds normal  MS: no gross musculoskeletal defects noted, no edema  NEURO: Normal strength and tone, mentation intact and speech normal  PSYCH: mentation appears normal, affect normal/bright        Signed Electronically by: Nilda Dominguez MD

## 2024-04-03 ENCOUNTER — PATIENT OUTREACH (OUTPATIENT)
Dept: CARE COORDINATION | Facility: CLINIC | Age: 64
End: 2024-04-03
Payer: COMMERCIAL

## 2024-04-03 LAB
CHOLEST SERPL-MCNC: 211 MG/DL
FASTING STATUS PATIENT QL REPORTED: YES
HCV AB SERPL QL IA: NONREACTIVE
HDLC SERPL-MCNC: 55 MG/DL
HIV 1+2 AB+HIV1 P24 AG SERPL QL IA: NONREACTIVE
LDLC SERPL CALC-MCNC: 128 MG/DL
NONHDLC SERPL-MCNC: 156 MG/DL
T4 FREE SERPL-MCNC: 1.08 NG/DL (ref 0.9–1.7)
TRIGL SERPL-MCNC: 139 MG/DL
TSH SERPL DL<=0.005 MIU/L-ACNC: 7.03 UIU/ML (ref 0.3–4.2)

## 2024-04-03 NOTE — LETTER
[facility] CARE COORDINATION  [facility]  April 5, 2024    Lucia Beltre  [address]   [facility] [address]      Dear Lucia,    I am a clinic community health worker who works with Nilda Dominguez MD with the [facility]. I wanted to introduce myself and provide you with my contact information for you to be able to call me with any questions or concerns. Below is a description of clinic care coordination and how I can further assist you.       The clinic care coordination team is made up of a registered nurse, , financial resource worker and community health worker who understand the health care system. The goal of clinic care coordination is to help you manage your health and improve access to the health care system. Our team works alongside your provider to assist you in determining your health and social needs. We can help you obtain health care and community resources, providing you with necessary information and education. We can work with you through any barriers and develop a care plan that helps coordinate and strengthen the communication between you and your care team.  Our services are voluntary and are offered without charge to you personally.    Please feel free to contact me with any questions or concerns regarding care coordination and what we can offer.      We are focused on providing you with the highest-quality healthcare experience possible.    Sincerely,       Sofi KERR. Public Health  Community Health Worker  [facility]:  [place] [place] [facility] Care Coordination  [phone]

## 2024-04-03 NOTE — PROGRESS NOTES
Clinic Care Coordination Contact  [facility]/Voicemail    Clinical Data: Care Coordinator Outreach    Outreach Documentation Number of Outreach Attempt   4/3/2024   1:08 PM 1       Left message on  Geetha (dtr CTC)  voicemail with call back information and requested return call.    Plan: Care Coordinator will try to reach patient again in 1-2 business days.    Sofi MISHRA Public [facility]  Community Health Worker  [facility]:  [place], [place] & [place]   Clinic Care Coordination  [phone]

## 2024-04-04 ENCOUNTER — THERAPY VISIT (OUTPATIENT)
Dept: PHYSICAL THERAPY | Facility: CLINIC | Age: 64
End: 2024-04-04
Payer: COMMERCIAL

## 2024-04-04 DIAGNOSIS — M54.6 BILATERAL THORACIC BACK PAIN, UNSPECIFIED CHRONICITY: Primary | ICD-10-CM

## 2024-04-04 PROCEDURE — 97110 THERAPEUTIC EXERCISES: CPT | Mod: GP | Performed by: PHYSICAL THERAPIST

## 2024-04-04 PROCEDURE — 97140 MANUAL THERAPY 1/> REGIONS: CPT | Mod: GP | Performed by: PHYSICAL THERAPIST

## 2024-04-05 NOTE — PROGRESS NOTES
Clinic Care Coordination Contact  [facility]/Voicemail    Clinical Data: Care Coordinator Outreach    Outreach Documentation Number of Outreach Attempt   4/3/2024   1:08 PM 1   4/5/2024   9:45 AM 2       Left message on  Daughter's  voicemail with call back information and requested return call.    Plan: Care Coordinator will send care coordination introduction letter with care coordinator contact information and explanation of care coordination services via mail. Care Coordinator will do no further outreaches at this time.    Sofi MISHRA Public Health  Community Health Worker  [facility]:  [facility] Care Coordination  [phone]

## 2024-04-07 PROCEDURE — 82274 ASSAY TEST FOR BLOOD FECAL: CPT | Performed by: INTERNAL MEDICINE

## 2024-04-09 LAB — HEMOCCULT STL QL IA: NEGATIVE

## 2024-04-12 ENCOUNTER — OFFICE VISIT (OUTPATIENT)
Dept: INTERNAL MEDICINE | Facility: CLINIC | Age: 64
End: 2024-04-12
Payer: COMMERCIAL

## 2024-04-12 ENCOUNTER — THERAPY VISIT (OUTPATIENT)
Dept: PHYSICAL THERAPY | Facility: CLINIC | Age: 64
End: 2024-04-12
Payer: COMMERCIAL

## 2024-04-12 VITALS
WEIGHT: 152.9 LBS | OXYGEN SATURATION: 99 % | BODY MASS INDEX: 26.1 KG/M2 | TEMPERATURE: 97.4 F | HEIGHT: 64 IN | HEART RATE: 52 BPM | SYSTOLIC BLOOD PRESSURE: 116 MMHG | DIASTOLIC BLOOD PRESSURE: 68 MMHG | RESPIRATION RATE: 16 BRPM

## 2024-04-12 DIAGNOSIS — R79.89 ELEVATED TSH: Primary | ICD-10-CM

## 2024-04-12 DIAGNOSIS — M54.6 BILATERAL THORACIC BACK PAIN, UNSPECIFIED CHRONICITY: Primary | ICD-10-CM

## 2024-04-12 DIAGNOSIS — K21.9 GASTROESOPHAGEAL REFLUX DISEASE, UNSPECIFIED WHETHER ESOPHAGITIS PRESENT: ICD-10-CM

## 2024-04-12 DIAGNOSIS — M25.561 CHRONIC PAIN OF RIGHT KNEE: ICD-10-CM

## 2024-04-12 DIAGNOSIS — G89.29 CHRONIC PAIN OF RIGHT KNEE: ICD-10-CM

## 2024-04-12 PROCEDURE — 97140 MANUAL THERAPY 1/> REGIONS: CPT | Mod: GP

## 2024-04-12 PROCEDURE — 99214 OFFICE O/P EST MOD 30 MIN: CPT | Performed by: INTERNAL MEDICINE

## 2024-04-12 PROCEDURE — 97110 THERAPEUTIC EXERCISES: CPT | Mod: GP

## 2024-04-12 PROCEDURE — T1013 SIGN LANG/ORAL INTERPRETER: HCPCS | Mod: U4

## 2024-04-12 ASSESSMENT — PAIN SCALES - GENERAL: PAINLEVEL: NO PAIN (0)

## 2024-04-12 NOTE — PROGRESS NOTES
"  Assessment & Plan     Elevated TSH  Clinically stable.  Does not endorse hypothyroid symptoms.  Repeat TSH lab work in 6 months.  - TSH with free T4 reflex; Future    Chronic pain of knee, bilateral  Again, role of physical therapy discussed with the patient.  Patient endorses transportation concerns.  Care coordination referral was placed and patient missed calls from the team.  Callback number provided to patient and the daughter.    Gastroesophageal reflux disease, unspecified whether esophagitis present  Clinically stable.  Continue omeprazole at the current dose.            BMI  Estimated body mass index is 26.25 kg/m  as calculated from the following:    Height as of this encounter: 1.626 m (5' 4\").    Weight as of this encounter: 69.4 kg (152 lb 14.4 oz).             Julio Myers is a 64 year old, presenting for the following health issues:  Follow Up        4/12/2024    11:33 AM   Additional Questions   Roomed by Ryan   Accompanied by Daughter      History of Present Illness       Reason for visit:  My doctor call me and she made the appointment    She eats 2-3 servings of fruits and vegetables daily.She consumes 1 sweetened beverage(s) daily.She exercises with enough effort to increase her heart rate 9 or less minutes per day.  She exercises with enough effort to increase her heart rate 3 or less days per week.   She is taking medications regularly.                 Review of Systems  Constitutional, HEENT, cardiovascular, pulmonary, gi and gu systems are negative, except as otherwise noted.      Objective    /68 (BP Location: Right arm, Patient Position: Sitting, Cuff Size: Adult Regular)   Pulse 52   Temp 97.4  F (36.3  C) (Tympanic)   Resp 16   Ht 1.626 m (5' 4\")   Wt 69.4 kg (152 lb 14.4 oz)   LMP  (LMP Unknown)   SpO2 99%   BMI 26.25 kg/m    Body mass index is 26.25 kg/m .  Physical Exam   GENERAL: alert and no distress  RESP: lungs clear to auscultation - no rales, rhonchi or " wheezes  CV: regular rate and rhythm, normal S1 S2  MS: no gross musculoskeletal defects noted, no edema  NEURO: Normal strength and tone, mentation intact and speech normal  PSYCH: mentation appears normal, affect normal/bright            Signed Electronically by: Nilda Dominguez MD

## 2024-04-18 ENCOUNTER — THERAPY VISIT (OUTPATIENT)
Dept: PHYSICAL THERAPY | Facility: CLINIC | Age: 64
End: 2024-04-18
Payer: COMMERCIAL

## 2024-04-18 DIAGNOSIS — M54.6 BILATERAL THORACIC BACK PAIN, UNSPECIFIED CHRONICITY: Primary | ICD-10-CM

## 2024-04-18 PROCEDURE — 97110 THERAPEUTIC EXERCISES: CPT | Mod: GP

## 2024-04-25 ENCOUNTER — THERAPY VISIT (OUTPATIENT)
Dept: PHYSICAL THERAPY | Facility: CLINIC | Age: 64
End: 2024-04-25
Payer: COMMERCIAL

## 2024-04-25 DIAGNOSIS — M54.6 BILATERAL THORACIC BACK PAIN, UNSPECIFIED CHRONICITY: Primary | ICD-10-CM

## 2024-04-25 PROCEDURE — 97112 NEUROMUSCULAR REEDUCATION: CPT | Mod: GP

## 2024-04-30 ENCOUNTER — PRE VISIT (OUTPATIENT)
Dept: OTOLARYNGOLOGY | Facility: CLINIC | Age: 64
End: 2024-04-30

## 2024-04-30 ENCOUNTER — OFFICE VISIT (OUTPATIENT)
Dept: OTOLARYNGOLOGY | Facility: CLINIC | Age: 64
End: 2024-04-30
Payer: COMMERCIAL

## 2024-04-30 VITALS
WEIGHT: 288.2 LBS | OXYGEN SATURATION: 98 % | DIASTOLIC BLOOD PRESSURE: 79 MMHG | SYSTOLIC BLOOD PRESSURE: 137 MMHG | HEART RATE: 54 BPM | BODY MASS INDEX: 41.35 KG/M2

## 2024-04-30 DIAGNOSIS — J30.1 SEASONAL ALLERGIC RHINITIS DUE TO POLLEN: ICD-10-CM

## 2024-04-30 DIAGNOSIS — J31.0 CHRONIC RHINITIS: Primary | ICD-10-CM

## 2024-04-30 PROCEDURE — 31231 NASAL ENDOSCOPY DX: CPT | Performed by: STUDENT IN AN ORGANIZED HEALTH CARE EDUCATION/TRAINING PROGRAM

## 2024-04-30 PROCEDURE — 99203 OFFICE O/P NEW LOW 30 MIN: CPT | Mod: 25 | Performed by: STUDENT IN AN ORGANIZED HEALTH CARE EDUCATION/TRAINING PROGRAM

## 2024-04-30 PROCEDURE — T1013 SIGN LANG/ORAL INTERPRETER: HCPCS | Mod: U4

## 2024-04-30 RX ORDER — AZELASTINE 1 MG/ML
1 SPRAY, METERED NASAL 2 TIMES DAILY
Qty: 30 ML | Refills: 3 | Status: SHIPPED | OUTPATIENT
Start: 2024-04-30 | End: 2024-08-26

## 2024-04-30 RX ORDER — FLUTICASONE PROPIONATE 50 MCG
2 SPRAY, SUSPENSION (ML) NASAL DAILY
Qty: 11.1 ML | Refills: 3 | Status: SHIPPED | OUTPATIENT
Start: 2024-04-30

## 2024-04-30 RX ORDER — FEXOFENADINE HCL 180 MG/1
180 TABLET ORAL DAILY
Qty: 30 TABLET | Refills: 3 | Status: SHIPPED | OUTPATIENT
Start: 2024-04-30

## 2024-04-30 ASSESSMENT — PAIN SCALES - GENERAL: PAINLEVEL: SEVERE PAIN (7)

## 2024-04-30 NOTE — PATIENT INSTRUCTIONS
You were seen in the ENT Clinic today by Dr. Rojas. If you have any questions or concerns after your appointment, please contact us (see below)    The following has been recommended for you based upon your appointment today:  Wilfredo and Flonase sent to the pharmacy  Call us to see how you are doing in a month        How to Contact Us:  Send a Mfuse message to your provider. Our team will respond to you via Mfuse. Occasionally, we will need to call you to get further information.  For urgent matters (Monday-Friday), call the ENT Clinic: [phone] and speak with a call center team member - they will route your call appropriately.   If you'd like to speak directly with a nurse, please find our contact information below. We do our best to check voicemail frequently throughout the day, and will work to call you back within 1-2 days. For urgent matters, please use the general clinic phone numbers listed above.    Lulú ADEN RN, BSN   RN Care Coordinator, ENT Clinic  [facility]  Direct: [phone]  Irene QUISPE LPN  Direct: [phone]          
decreased dexter/decreased step length

## 2024-04-30 NOTE — LETTER
4/30/2024       RE: Alessandra Flynn  [address]  [place] [address]     Dear Colleague,    Thank you for referring your patient, Alessandra Flynn, to the [facility] [place] [facility]. Please see a copy of my visit note below.      [facility] - Rhinology  New Patient Visit      Encounter date:   April 30, 2024    Referring Provider:  Vandana Kendrick  [address] [place] [address]    Assessment, Decision Making, and Plan:  (J31.0) Chronic rhinitis  (primary encounter diagnosis)  (J30.1) Seasonal allergic rhinitis due to pollen  Comment:   --suspect AR  --no trial of nasal medication  Plan: IMAGESTREAM RECORDING ORDER, fluticasone         (FLONASE) 50 MCG/ACT nasal spray, azelastine         (ASTELIN) 0.1 % nasal spray   Fexofenadine daily  --daily fexofenadine, flonase, astelin  --can consider allergy testing at follow up  --possible atrovent trial    Chief Complaint: allergic rhinitis    History of Present Illness:   Alessandra Flynn is a 64 year old female who presents for consultation regarding allergic rhinitis.    Itchy watery eyes  Sneezing runny nose  Can be triggered by multiple things  Some pressure inbetween eyes    No issues with recurrent sinus infections  No history of sinus surgery    Review of systems: A 14-point review of systems has been conducted and was negative for any notable symptoms, except as dictated in the history of present illness.     Medical History:  No past medical history on file.     Surgical History:   Past Surgical History:   Procedure Laterality Date    ANKLE SURGERY      REMOVE HARDWARE ANKLE Left 11/9/2016    Procedure: REMOVE HARDWARE ANKLE;  Surgeon: Zack Hardy MD;  Location:  OR        Family History:  No family history on file.     Social History:   Social History     Socioeconomic History    Marital status:      Spouse name: None     Number of children: None    Years of education: None    Highest education level: None   Tobacco Use    Smoking status: Never    Smokeless tobacco: Never   Substance and Sexual Activity    Alcohol use: No    Drug use: No    Sexual activity: Not Currently     Social Determinants of Health     Financial Resource Strain: Not at Risk (2022)    Received from [facility]     Financial Resource Strain     Financial Resource Strain: 1   Food Insecurity: Not at Risk (2022)    Received from [facility]     Food Insecurity     Food: 1   Transportation Needs: Not at Risk (2022)    Received from [facility]     Transportation Needs     Transportation: 1   Physical Activity: Not on File (2022)    Received from [facility]     Physical Activity     Physical Activity: 0   Stress: Not at Risk (2022)    Received from [facility]     Stress     Stress: 1   Social Connections: Not at Risk (2022)    Received from [facility]     Social Connections     Social Connections and Isolation: 1   Housing Stability: Not at Risk (2022)    Received from [facility]     Housing Stability     Housin        Physical Exam:  Vital signs: /79   Pulse 54   Wt 130.7 kg (288 lb 3.2 oz)   SpO2 98%   BMI 41.35 kg/m     General Appearance: No acute distress, appropriate demeanor, conversant  Eyes: moist conjunctivae; EOMI; pupils symmetric; visual acuity grossly intact; no proptosis  Head: normocephalic; overall symmetric appearance without deformity  Face: overall symmetric without deformity  Ears: Normal appearance of external ear; external meatus normal in appearance; TMs intact without perforation bilaterally;   Nose: No external deformity; septum (midline) ; inferior turbinates (large hypertrophic pale blue)   Oral Cavity/oropharynx: Normal appearance of mucosa; tongue midline; no mass or lesions; oropharynx without obvious mucosal abnormality  Neck: no palpable lymphadenopathy; thyroid  without palpable nodules  Lungs: symmetric chest rise; no wheezing  CV: Good distal perfusion; normal hear rate  Extremities: No deformity  Neurologic Exam: Cranial nerves II-XII are grossly intact; no focal deficit    Procedure Note  Procedure performed: Rigid nasal endoscopy  Indication: To evaluate for sinonasal pathology not visualized on routine anterior rhinoscopy  Anesthesia: 4% topical lidocaine with 0.05% oxymetazoline  Description of procedure: A 30 degree, 3 mm rigid endoscope was inserted into bilateral nasal cavities and the nasal valves, nasal cavity, middle meatus, sphenoethmoid recess, and nasopharynx were thoroughly evaluated for evidence of obstruction, edema, purulence, polyps and/or mass/lesion.     Findings:    MM SER NP clear  Bilateral pale blue edematous IT    The patient tolerated the procedure well without complication.     Ant Rojas MD    [facility]  Rhinology  Endoscopic Skull Base Surgery  [facility]  Department of Otolaryngology - Head & Neck Surgery

## 2024-04-30 NOTE — PROGRESS NOTES
[facility] - Rhinology  New Patient Visit      Encounter date:   April 30, 2024    Referring Provider:  Vandana Kendrick  [address] [address]    Assessment, Decision Making, and Plan:  (J31.0) Chronic rhinitis  (primary encounter diagnosis)  (J30.1) Seasonal allergic rhinitis due to pollen  Comment:   --suspect AR  --no trial of nasal medication  Plan: IMAGESTREAM RECORDING ORDER, fluticasone         (FLONASE) 50 MCG/ACT nasal spray, azelastine         (ASTELIN) 0.1 % nasal spray   Fexofenadine daily  --daily fexofenadine, flonase, astelin  --can consider allergy testing at follow up  --possible atrovent trial    Chief Complaint: allergic rhinitis    History of Present Illness:   Alessandra Flynn is a 64 year old female who presents for consultation regarding allergic rhinitis.    Itchy watery eyes  Sneezing runny nose  Can be triggered by multiple things  Some pressure inbetween eyes    No issues with recurrent sinus infections  No history of sinus surgery    Review of systems: A 14-point review of systems has been conducted and was negative for any notable symptoms, except as dictated in the history of present illness.     Medical History:  No past medical history on file.     Surgical History:   Past Surgical History:   Procedure Laterality Date    ANKLE SURGERY      REMOVE HARDWARE ANKLE Left 11/9/2016    Procedure: REMOVE HARDWARE ANKLE;  Surgeon: Zack Hardy MD;  Location:  OR        Family History:  No family history on file.     Social History:   Social History     Socioeconomic History    Marital status:      Spouse name: None    Number of children: None    Years of education: None    Highest education level: None   Tobacco Use    Smoking status: Never    Smokeless tobacco: Never   Substance and Sexual Activity    Alcohol use: No    Drug use: No    Sexual activity: Not Currently     Social Determinants of Health     Financial Resource Strain: Not at Risk  (2022)    Received from [facility]     Financial Resource Strain     Financial Resource Strain: 1   Food Insecurity: Not at Risk (2022)    Received from [facility]     Food Insecurity     Food: 1   Transportation Needs: Not at Risk (2022)    Received from [facility]     Transportation Needs     Transportation: 1   Physical Activity: Not on File (2022)    Received from [facility]     Physical Activity     Physical Activity: 0   Stress: Not at Risk (2022)    Received from [facility]     Stress     Stress: 1   Social Connections: Not at Risk (2022)    Received from [facility]     Social Connections     Social Connections and Isolation: 1   Housing Stability: Not at Risk (2022)    Received from [facility]     Housing Stability     Housin        Physical Exam:  Vital signs: /79   Pulse 54   Wt 130.7 kg (288 lb 3.2 oz)   SpO2 98%   BMI 41.35 kg/m     General Appearance: No acute distress, appropriate demeanor, conversant  Eyes: moist conjunctivae; EOMI; pupils symmetric; visual acuity grossly intact; no proptosis  Head: normocephalic; overall symmetric appearance without deformity  Face: overall symmetric without deformity  Ears: Normal appearance of external ear; external meatus normal in appearance; TMs intact without perforation bilaterally;   Nose: No external deformity; septum (midline) ; inferior turbinates (large hypertrophic pale blue)   Oral Cavity/oropharynx: Normal appearance of mucosa; tongue midline; no mass or lesions; oropharynx without obvious mucosal abnormality  Neck: no palpable lymphadenopathy; thyroid without palpable nodules  Lungs: symmetric chest rise; no wheezing  CV: Good distal perfusion; normal hear rate  Extremities: No deformity  Neurologic Exam: Cranial nerves II-XII are grossly intact; no focal deficit    Procedure Note  Procedure performed: Rigid nasal endoscopy  Indication: To evaluate for sinonasal pathology not  visualized on routine anterior rhinoscopy  Anesthesia: 4% topical lidocaine with 0.05% oxymetazoline  Description of procedure: A 30 degree, 3 mm rigid endoscope was inserted into bilateral nasal cavities and the nasal valves, nasal cavity, middle meatus, sphenoethmoid recess, and nasopharynx were thoroughly evaluated for evidence of obstruction, edema, purulence, polyps and/or mass/lesion.     Findings:    MM SER NP clear  Bilateral pale blue edematous IT    The patient tolerated the procedure well without complication.     Ant Rojas MD    [facility]  Rhinology  Endoscopic Skull Base Surgery  [facility]  Department of Otolaryngology - Head & Neck Surgery

## 2024-05-17 ENCOUNTER — THERAPY VISIT (OUTPATIENT)
Dept: PHYSICAL THERAPY | Facility: CLINIC | Age: 64
End: 2024-05-17
Payer: COMMERCIAL

## 2024-05-17 DIAGNOSIS — M54.6 BILATERAL THORACIC BACK PAIN, UNSPECIFIED CHRONICITY: Primary | ICD-10-CM

## 2024-05-17 PROCEDURE — 97112 NEUROMUSCULAR REEDUCATION: CPT | Mod: GP

## 2024-05-24 ENCOUNTER — THERAPY VISIT (OUTPATIENT)
Dept: PHYSICAL THERAPY | Facility: CLINIC | Age: 64
End: 2024-05-24
Payer: COMMERCIAL

## 2024-05-24 DIAGNOSIS — M54.6 BILATERAL THORACIC BACK PAIN, UNSPECIFIED CHRONICITY: Primary | ICD-10-CM

## 2024-05-24 PROCEDURE — 97112 NEUROMUSCULAR REEDUCATION: CPT | Mod: GP

## 2024-05-24 NOTE — PROGRESS NOTES
05/24/24 0500   Appointment Info   Signing clinician's name / credentials Nancy Cardoso, PT, DPT   Total/Authorized Visits E&T (12 planned)   Visits Used 8   Medical Diagnosis Bilateral thoracic back pain, unspecified chronicity   PT Tx Diagnosis B shoulder and back pain   Quick Adds Certification   Progress Note/Certification   Start of Care Date 03/06/24   Onset of illness/injury or Date of Surgery 01/06/24   Therapy Frequency 1x/week   Predicted Duration 12 weeks   Certification date from 03/06/24   Certification date to 05/29/24   Progress Note Due Date 05/05/24   Progress Note Completed Date 03/06/24       Present Yes    Language Senegalese   GOALS   PT Goals 2   PT Goal 1   Goal Identifier Prayers   Goal Description Pt will be able to get into proper positions for daily prayers   Rationale to maximize safety and independence with self cares;to maximize safety and independence with performance of ADLs and functional tasks   Goal Progress goal met   Target Date 05/29/24   Date Met 05/24/24   PT Goal 2   Goal Identifier Fatigue   Goal Description Pt will be able to complete physical activity such as walking, exercise, chores etc without pain or excessive fatigue   Rationale to maximize safety and independence with performance of ADLs and functional tasks;to maximize safety and independence within the home;to maximize safety and independence with self cares   Goal Progress Goal met   Target Date 05/29/24   Date Met 05/24/24   Subjective Report   Subjective Report Pt reports that she is feeling great and is ready to discharge.   Treatment Interventions (PT)   Interventions Therapeutic Procedure/Exercise;Manual Therapy;Neuromuscular Re-education   Therapeutic Procedure/Exercise   Therapeutic Procedures Ther Proc 2;Ther Proc 3   PTRx Ther Proc 1 Hip Abduction With Theraband   PTRx Ther Proc 1 - Details rev   Patient Response/Progress tolerated well   Neuromuscular Re-education    Neuromuscular re-ed of mvmt, balance, coord, kinesthetic sense, posture, proprioception minutes (40151) 30   Neuromuscular Re-education Neuro Re-ed 2;Neuro Re-ed 3   Neuro Re-ed 1 Hip abdction isometric at wall   Neuro Re-ed 1 - Details 1x10 with 5 second holds B   Neuro Re-ed 2 Balance with perturbations on foam   Neuro Re-ed 2 - Details Single leg stance, 30 sec x2 B   PTRx Neuro Re-ed 1 Step Up   PTRx Neuro Re-ed 1 - Details rev   PTRx Neuro Re-ed 2 Pallof Press   PTRx Neuro Re-ed 2 - Details rev   PTRx Neuro Re-ed 3 Forward Lunge on BOSU   PTRx Neuro Re-ed 3 - Details 2x10 B cues to press firmly off of bosu to challenge stability when coming out of lunge. Additionally completed 2x10 of lateral lunges    Skilled Intervention Edu on reactive balance and importance of gluteal strength for balance.   Patient Response/Progress tolerated well   Neuro Re-ed 3 Tap at random to blaze pods   Neuro Re-ed 3 - Details 2x30 seconds B. Blaze pods positioned in front of pt, 45 degrees to side, and 90 degrees to side. Pt instructed to keep weight on stance leg for entire time while tapping blaze pods   Education   Learner/Method Patient   Education Comments Eager to participate in therapy   Plan   Home program See PTRX- printed   Plan for next session D/c   Total Session Time   Timed Code Treatment Minutes 30   Total Treatment Time (sum of timed and untimed services) 30         DISCHARGE  Reason for Discharge: Patient has met all goals.    Equipment Issued: n/a    Discharge Plan: Patient to continue home program. Pt edu on completion of exercises every other day to maintain strength.     Referring Provider:  Vandana Kendrick

## 2024-07-22 ENCOUNTER — TELEPHONE (OUTPATIENT)
Dept: OPHTHALMOLOGY | Facility: CLINIC | Age: 64
End: 2024-07-22
Payer: COMMERCIAL

## 2024-07-25 ENCOUNTER — OFFICE VISIT (OUTPATIENT)
Dept: OPHTHALMOLOGY | Facility: CLINIC | Age: 64
End: 2024-07-25
Payer: COMMERCIAL

## 2024-07-25 DIAGNOSIS — H25.13 NUCLEAR SCLEROSIS OF BOTH EYES: ICD-10-CM

## 2024-07-25 DIAGNOSIS — H02.88A MEIBOMIAN GLAND DYSFUNCTION (MGD) OF UPPER AND LOWER LIDS OF BOTH EYES: ICD-10-CM

## 2024-07-25 DIAGNOSIS — H52.03 HYPERMETROPIA OF BOTH EYES: Primary | ICD-10-CM

## 2024-07-25 DIAGNOSIS — H02.88B MEIBOMIAN GLAND DYSFUNCTION (MGD) OF UPPER AND LOWER LIDS OF BOTH EYES: ICD-10-CM

## 2024-07-25 PROCEDURE — 92015 DETERMINE REFRACTIVE STATE: CPT | Performed by: OPTOMETRIST

## 2024-07-25 PROCEDURE — 92004 COMPRE OPH EXAM NEW PT 1/>: CPT | Performed by: OPTOMETRIST

## 2024-07-25 RX ORDER — SIMVASTATIN 40 MG
40 TABLET ORAL AT BEDTIME
COMMUNITY

## 2024-07-25 RX ORDER — NICOTINE POLACRILEX 4 MG/1
GUM, CHEWING ORAL
COMMUNITY
Start: 2024-07-24

## 2024-07-25 RX ORDER — FUROSEMIDE 20 MG
20 TABLET ORAL DAILY
COMMUNITY

## 2024-07-25 RX ORDER — FAMOTIDINE 20 MG/1
20 TABLET, FILM COATED ORAL
COMMUNITY
Start: 2024-07-24

## 2024-07-25 RX ORDER — METHOCARBAMOL 500 MG/1
500 TABLET, FILM COATED ORAL
COMMUNITY
Start: 2024-04-23

## 2024-07-25 RX ORDER — CAPSAICIN 0.025 %
CREAM (GRAM) TOPICAL
COMMUNITY

## 2024-07-25 ASSESSMENT — SLIT LAMP EXAM - LIDS
COMMENTS: TRACE MGD
COMMENTS: TRACE MGD

## 2024-07-25 ASSESSMENT — REFRACTION_MANIFEST
OS_SPHERE: +1.25
OD_CYLINDER: +0.25
OS_CYLINDER: +0.75
OD_SPHERE: +0.75
OS_ADD: +2.50
OD_AXIS: 167
OD_ADD: +2.50
OS_AXIS: 007

## 2024-07-25 ASSESSMENT — CONF VISUAL FIELD
OD_SUPERIOR_NASAL_RESTRICTION: 0
OD_INFERIOR_TEMPORAL_RESTRICTION: 0
OS_INFERIOR_NASAL_RESTRICTION: 0
OD_SUPERIOR_TEMPORAL_RESTRICTION: 0
OS_NORMAL: 1
OS_SUPERIOR_TEMPORAL_RESTRICTION: 0
OD_INFERIOR_NASAL_RESTRICTION: 0
OS_INFERIOR_TEMPORAL_RESTRICTION: 0
OS_SUPERIOR_NASAL_RESTRICTION: 0
METHOD: COUNTING FINGERS
OD_NORMAL: 1

## 2024-07-25 ASSESSMENT — EXTERNAL EXAM - RIGHT EYE: OD_EXAM: NORMAL

## 2024-07-25 ASSESSMENT — TONOMETRY
OD_IOP_MMHG: 10
IOP_METHOD: ICARE
OS_IOP_MMHG: 14

## 2024-07-25 ASSESSMENT — VISUAL ACUITY
OD_PH_SC: 20/30
OD_SC: 20/60
OS_PH_SC: 20/30
OS_SC: 20/40
OS_SC+: -1
OD_PH_SC+: -1
METHOD: SNELLEN - LINEAR
OD_SC+: -1

## 2024-07-25 ASSESSMENT — CUP TO DISC RATIO
OS_RATIO: 0.1
OD_RATIO: 0.1

## 2024-07-25 ASSESSMENT — EXTERNAL EXAM - LEFT EYE: OS_EXAM: NORMAL

## 2024-07-25 NOTE — NURSING NOTE
Chief Complaints and History of Present Illnesses   Patient presents with    Annual Eye Exam     Chief Complaint(s) and History of Present Illness(es)       Annual Eye Exam              Laterality: both eyes    Onset: 3 years ago    Severity: mild    Course: gradually worsening    Treatments tried: eye drops    Response to treatment: no improvement              Comments    Spoke to patient with use of .  Just wearing reading glasses right now.  Has never worn contacts in the past.  Some itchiness that is very bothersome at times.  Uses eye drops and does not help.  Eyes also feel dry.   Denies flashes or floaters.     Kellie Felix on 7/25/2024 at 1:55 PM

## 2024-07-25 NOTE — PROGRESS NOTES
History  HPI       Annual Eye Exam    In both eyes.  This started 3 years ago.  Severity is mild.  Since onset it is gradually worsening.  Treatments tried include eye drops.  Response to treatment was no improvement.             Comments    Spoke to patient with use of .  Just wearing reading glasses right now.  Has never worn contacts in the past.  Some itchiness that is very bothersome at times.  Uses eye drops and does not help.  Eyes also feel dry.   Denies flashes or floaters.      ID: [id number]    Kellie Felix on 7/25/2024 at 1:55 PM                 Last edited by Federico Weston on 7/25/2024  2:42 PM.        Assessment/Plan  (H52.03) Hypermetropia of both eyes  (primary encounter diagnosis)  Comment: Hyperopic astigmatism both eyes with presbyopia  Plan: REFRACTION         Educated patient on condition and clinical findings. Dispensed spectacle prescription for full time wear. Monitor annually.    (H02.88A,  H02.88B) Meibomian gland dysfunction (MGD) of upper and lower lids of both eyes  Comment: Symptomatic with dryness  Plan:  Recommended warm compresses and artificial tears as needed. Monitor annually, or sooner if symptoms worsen.    (H25.13) Nuclear sclerosis of both eyes  Comment: Not visually significant  Plan: No treatment indicated at this time. Monitor annually.    Return to clinic in 1 year for comprehensive eye exam.    Complete documentation of historical and exam elements from today's encounter can  be found in the full encounter summary report (not reduplicated in this progress  note). I personally obtained the chief complaint(s) and history of present illness. I  confirmed and edited as necessary the review of systems, past medical/surgical  history, family history, social history, and examination findings as documented by  others; and I examined the patient myself. I personally reviewed the relevant tests,  images, and reports as documented above. I formulated and  edited as necessary the  assessment and plan and discussed the findings and management plan with the  patient and family.    Yung Peoples OD, FAAO

## 2024-07-26 ENCOUNTER — ANCILLARY PROCEDURE (OUTPATIENT)
Dept: MRI IMAGING | Facility: CLINIC | Age: 64
End: 2024-07-26
Attending: FAMILY MEDICINE
Payer: COMMERCIAL

## 2024-07-26 DIAGNOSIS — G89.29 CHRONIC LOW BACK PAIN, UNSPECIFIED BACK PAIN LATERALITY, UNSPECIFIED WHETHER SCIATICA PRESENT: ICD-10-CM

## 2024-07-26 DIAGNOSIS — M54.50 CHRONIC LOW BACK PAIN, UNSPECIFIED BACK PAIN LATERALITY, UNSPECIFIED WHETHER SCIATICA PRESENT: ICD-10-CM

## 2024-07-26 PROCEDURE — 72148 MRI LUMBAR SPINE W/O DYE: CPT | Performed by: RADIOLOGY

## 2024-07-26 PROCEDURE — T1013 SIGN LANG/ORAL INTERPRETER: HCPCS | Mod: U3

## 2024-08-26 DIAGNOSIS — J30.1 SEASONAL ALLERGIC RHINITIS DUE TO POLLEN: ICD-10-CM

## 2024-08-26 DIAGNOSIS — J31.0 CHRONIC RHINITIS: ICD-10-CM

## 2024-08-26 RX ORDER — AZELASTINE 1 MG/ML
1 SPRAY, METERED NASAL 2 TIMES DAILY
Qty: 30 ML | Refills: 3 | Status: SHIPPED | OUTPATIENT
Start: 2024-08-26

## 2024-09-10 ENCOUNTER — TELEPHONE (OUTPATIENT)
Dept: INTERNAL MEDICINE | Facility: CLINIC | Age: 64
End: 2024-09-10
Payer: COMMERCIAL

## 2024-09-10 NOTE — TELEPHONE ENCOUNTER
Patient Scheduled.    9/11/2024 at 10:30am  [facility] [place] [address] [place] [id number] [phone]

## 2024-09-10 NOTE — TELEPHONE ENCOUNTER
Reason for Call:  Appointment Request    Patient requesting this type of appt:  Lucia Beltre    Requested provider: Nilda Dominguez    Reason patient unable to be scheduled: Not within requested timeframe    When does patient want to be seen/preferred time:  1-4 days    Comments: FOR 2 WEEKS SHE'S HAD BACK PAIN WHERE IT'S HARD TO WALK,GO TO BATHROOM AND BEND OVER.    Okay to leave a detailed message?: Yes at Cell number on file:    Telephone Information:   Mobile [phone]       Call taken on 9/10/2024 at 12:14 PM by Tami Salvador

## 2024-09-11 ENCOUNTER — VIRTUAL VISIT (OUTPATIENT)
Dept: INTERPRETER SERVICES | Facility: CLINIC | Age: 64
End: 2024-09-11

## 2024-09-11 ENCOUNTER — OFFICE VISIT (OUTPATIENT)
Dept: INTERNAL MEDICINE | Facility: CLINIC | Age: 64
End: 2024-09-11
Payer: COMMERCIAL

## 2024-09-11 ENCOUNTER — ANCILLARY PROCEDURE (OUTPATIENT)
Dept: GENERAL RADIOLOGY | Facility: CLINIC | Age: 64
End: 2024-09-11
Payer: COMMERCIAL

## 2024-09-11 VITALS
HEIGHT: 64 IN | WEIGHT: 147.7 LBS | DIASTOLIC BLOOD PRESSURE: 84 MMHG | RESPIRATION RATE: 16 BRPM | BODY MASS INDEX: 25.22 KG/M2 | OXYGEN SATURATION: 100 % | HEART RATE: 95 BPM | SYSTOLIC BLOOD PRESSURE: 120 MMHG | TEMPERATURE: 98.6 F

## 2024-09-11 DIAGNOSIS — M25.551 HIP PAIN, RIGHT: Primary | ICD-10-CM

## 2024-09-11 DIAGNOSIS — R05.1 ACUTE COUGH: ICD-10-CM

## 2024-09-11 DIAGNOSIS — R09.81 NASAL CONGESTION: ICD-10-CM

## 2024-09-11 DIAGNOSIS — M25.551 HIP PAIN, RIGHT: ICD-10-CM

## 2024-09-11 PROCEDURE — 73502 X-RAY EXAM HIP UNI 2-3 VIEWS: CPT | Mod: TC | Performed by: RADIOLOGY

## 2024-09-11 PROCEDURE — T1013 SIGN LANG/ORAL INTERPRETER: HCPCS | Mod: U4

## 2024-09-11 PROCEDURE — 99203 OFFICE O/P NEW LOW 30 MIN: CPT

## 2024-09-11 RX ORDER — ALBUTEROL SULFATE 90 UG/1
2 AEROSOL, METERED RESPIRATORY (INHALATION) EVERY 6 HOURS PRN
Qty: 18 G | Refills: 1 | Status: SHIPPED | OUTPATIENT
Start: 2024-09-11

## 2024-09-11 RX ORDER — PREDNISONE 20 MG/1
40 TABLET ORAL DAILY
Qty: 10 TABLET | Refills: 0 | Status: SHIPPED | OUTPATIENT
Start: 2024-09-11 | End: 2024-09-16

## 2024-09-11 RX ORDER — FLUTICASONE PROPIONATE 50 MCG
1 SPRAY, SUSPENSION (ML) NASAL DAILY
Qty: 11.1 ML | Refills: 1 | Status: SHIPPED | OUTPATIENT
Start: 2024-09-11

## 2024-09-11 ASSESSMENT — ENCOUNTER SYMPTOMS
COUGH: 1
BACK PAIN: 1

## 2024-09-11 NOTE — PATIENT INSTRUCTIONS
Prednisone- This medication is a steroid. It can naturally increase your blood sugar, therefore I suggest taking this medication with Protein (egg, non pork based sausage, protein shake/bar) in the AM. I also recommend taking this in the AM to avoid sleep disturbances. You are given 40 mg (2 Tablets) to be taken at once in the AM for 5 days.      Hip xray on the right- follow-up with ortho. They will call you in the next 1-2 business days. If you do not hear from them, please call the number below.

## 2024-09-11 NOTE — PROGRESS NOTES
"  Assessment & Plan     Hip pain, right  Patient presents to the clinic for a chief complaint of ongoing right hip pain.  Will order imaging and refer to Ortho and PT.  - XR Hip Right 2-3 Views; Future  - Orthopedic  Referral; Future  - Physical Therapy  Referral; Future    Acute cough  Patient presents to the clinic with a chief complaint of acute cough for the last 2 weeks.  We will start patient on a prednisone burst 40 mg once daily for 5 days.  I will also give her an albuterol inhaler for breakthrough shortness of breath or wheezing.  Patient agrees with plan.  - predniSONE (DELTASONE) 20 MG tablet; Take 2 tablets (40 mg) by mouth daily for 5 days.  - albuterol (PROAIR HFA/PROVENTIL HFA/VENTOLIN HFA) 108 (90 Base) MCG/ACT inhaler; Inhale 2 puffs into the lungs every 6 hours as needed for shortness of breath, wheezing or cough.    Nasal congestion  Patient reports she has seasonal allergies.  We will start her with Flonase 1 spray into both nostrils once daily.  - fluticasone (FLONASE) 50 MCG/ACT nasal spray; Spray 1 spray into both nostrils daily.        30 minutes spent by me on the date of the encounter doing chart review, review of test results, interpretation of tests, patient visit, and documentation     BMI  Estimated body mass index is 25.35 kg/m  as calculated from the following:    Height as of this encounter: 1.626 m (5' 4\").    Weight as of this encounter: 67 kg (147 lb 11.2 oz).   Weight management plan: Patient was referred to their PCP to discuss a diet and exercise plan.          Julio Myers is a 64 year old, presenting for the following health issues:  Patient is here today accompanied by daughter and [place] phone . She states she is here today for a complaint of back pain, This started about 3 weeks ago. The pain is primarily on the right hip.   She also has a cough- maybe a little over a week.   Cough: dry cough. Get worse at night.   OTC: she does take a " "cough medication but she isn't sure the name. She states she was prescribed something by her PCP. Per her record, that was omeprazole.     Back Pain (She has back pain, shoulder pain, and hip joints pain. It started 3 weeks ago.) and Cough      9/11/2024    10:18 AM   Additional Questions   Roomed by Lilli Chavez MA   Accompanied by Her Daughter & Phone      Back Pain     Cough    History of Present Illness       Reason for visit:  I have pains in the back She is missing 2 dose(s) of medications per week.                 Review of Systems  Constitutional, HEENT, cardiovascular, pulmonary, gi and gu systems are negative, except as otherwise noted.      Objective    /84 (BP Location: Right arm, Patient Position: Sitting, Cuff Size: Adult Regular)   Pulse 95   Temp 98.6  F (37  C) (Oral)   Resp 16   Ht 1.626 m (5' 4\")   Wt 67 kg (147 lb 11.2 oz)   LMP  (LMP Unknown)   SpO2 100%   BMI 25.35 kg/m    Body mass index is 25.35 kg/m .  Physical Exam   GENERAL: alert and no distress  NECK: no adenopathy, no asymmetry, masses, or scars  RESP: lungs clear to auscultation - no rales, rhonchi or wheezes  CV: regular rate and rhythm, normal S1 S2, no S3 or S4, no murmur, click or rub, no peripheral edema  ABDOMEN: soft, nontender, no hepatosplenomegaly, no masses and bowel sounds normal  MS: no gross musculoskeletal defects noted, no edema              Signed Electronically by: VIKTOR Nunn CNP    "

## 2024-09-16 ENCOUNTER — PATIENT OUTREACH (OUTPATIENT)
Dept: CARE COORDINATION | Facility: CLINIC | Age: 64
End: 2024-09-16
Payer: COMMERCIAL

## 2024-10-29 ENCOUNTER — TRANSCRIBE ORDERS (OUTPATIENT)
Dept: OTHER | Age: 64
End: 2024-10-29

## 2024-10-29 DIAGNOSIS — I83.813 VARICOSE VEINS OF BOTH LOWER EXTREMITIES WITH PAIN: Primary | ICD-10-CM

## 2024-10-31 ENCOUNTER — TELEPHONE (OUTPATIENT)
Dept: VASCULAR SURGERY | Facility: CLINIC | Age: 64
End: 2024-10-31
Payer: COMMERCIAL

## 2024-10-31 DIAGNOSIS — I83.893 SYMPTOMATIC VARICOSE VEINS OF BOTH LOWER EXTREMITIES: Primary | ICD-10-CM

## 2024-10-31 NOTE — TELEPHONE ENCOUNTER
Previsit call to pt to further follow up on her referral for her varicose veins.    Used [facility] .       PVD ASSESSMENT:     Location: where on your leg/legs?    Below her  knees and calves     Mostly on right calf and knee-very visible.    She does walk a lot;     But both legs have visible VV    There is pain, she notes that it goes up into her upper legs.      How long?  Notes that this has been ongoing for quite a long time, and she did have imaging done.     She was given some pills, and didn't seem to help.     She's not sure about the medication    She was also given a cream - but unsure of name b/c it was such a long time ago.       She is not wearing stockings.     Broke her right leg, with a fall in 2019    Bilateral leg edema also    Informed her that our  will reach out to her and help her with these appts.     She verbalized understanding.         Azra BENJAMIN RN, BSN  Interventional Radiology/Vascular  Nurse Coordinator  Phone: [phone], option 2

## 2024-11-15 ENCOUNTER — ANCILLARY PROCEDURE (OUTPATIENT)
Dept: ULTRASOUND IMAGING | Facility: CLINIC | Age: 64
End: 2024-11-15
Attending: RADIOLOGY
Payer: COMMERCIAL

## 2024-11-15 DIAGNOSIS — I83.893 SYMPTOMATIC VARICOSE VEINS OF BOTH LOWER EXTREMITIES: ICD-10-CM

## 2024-11-15 PROCEDURE — 93970 EXTREMITY STUDY: CPT | Performed by: RADIOLOGY

## 2024-11-26 ENCOUNTER — TELEPHONE (OUTPATIENT)
Dept: VASCULAR SURGERY | Facility: CLINIC | Age: 64
End: 2024-11-26
Payer: COMMERCIAL

## 2024-11-26 NOTE — TELEPHONE ENCOUNTER
The pt NO SHOWED the appointment that was scheduled on 11/21/24 with the CHALO Pope a letter has been sent out to the patient.  Sean Doyle on 11/26/2024 at 4:15 PM

## 2024-12-08 ENCOUNTER — HOSPITAL ENCOUNTER (EMERGENCY)
Facility: CLINIC | Age: 64
Discharge: HOME | End: 2024-12-08
Attending: PHYSICIAN ASSISTANT | Admitting: PHYSICIAN ASSISTANT
Payer: COMMERCIAL

## 2024-12-08 VITALS
WEIGHT: 150.35 LBS | DIASTOLIC BLOOD PRESSURE: 82 MMHG | BODY MASS INDEX: 29.52 KG/M2 | HEART RATE: 109 BPM | TEMPERATURE: 98.1 F | SYSTOLIC BLOOD PRESSURE: 120 MMHG | RESPIRATION RATE: 20 BRPM | OXYGEN SATURATION: 100 % | HEIGHT: 60 IN

## 2024-12-08 DIAGNOSIS — J02.0 STREP PHARYNGITIS: ICD-10-CM

## 2024-12-08 LAB
FLUAV RNA SPEC QL NAA+PROBE: NEGATIVE
FLUBV RNA RESP QL NAA+PROBE: NEGATIVE
RSV RNA SPEC NAA+PROBE: NEGATIVE
S PYO DNA THROAT QL NAA+PROBE: DETECTED
SARS-COV-2 RNA RESP QL NAA+PROBE: NEGATIVE

## 2024-12-08 PROCEDURE — 87637 SARSCOV2&INF A&B&RSV AMP PRB: CPT | Performed by: EMERGENCY MEDICINE

## 2024-12-08 PROCEDURE — 99283 EMERGENCY DEPT VISIT LOW MDM: CPT

## 2024-12-08 PROCEDURE — 87651 STREP A DNA AMP PROBE: CPT | Performed by: EMERGENCY MEDICINE

## 2024-12-08 PROCEDURE — 250N000013 HC RX MED GY IP 250 OP 250 PS 637: Performed by: PHYSICIAN ASSISTANT

## 2024-12-08 PROCEDURE — 250N000013 HC RX MED GY IP 250 OP 250 PS 637: Performed by: EMERGENCY MEDICINE

## 2024-12-08 PROCEDURE — 87637 SARSCOV2&INF A&B&RSV AMP PRB: CPT | Performed by: PHYSICIAN ASSISTANT

## 2024-12-08 PROCEDURE — 87651 STREP A DNA AMP PROBE: CPT | Performed by: PHYSICIAN ASSISTANT

## 2024-12-08 RX ORDER — ACETAMINOPHEN 500 MG
1000 TABLET ORAL ONCE
Status: COMPLETED | OUTPATIENT
Start: 2024-12-08 | End: 2024-12-08

## 2024-12-08 RX ORDER — AMOXICILLIN 875 MG/1
875 TABLET, COATED ORAL 2 TIMES DAILY
Qty: 20 TABLET | Refills: 0 | Status: SHIPPED | OUTPATIENT
Start: 2024-12-08 | End: 2024-12-18

## 2024-12-08 RX ADMIN — ACETAMINOPHEN 1000 MG: 500 TABLET, FILM COATED ORAL at 07:36

## 2024-12-08 RX ADMIN — Medication 10 MG: at 09:21

## 2024-12-08 ASSESSMENT — COLUMBIA-SUICIDE SEVERITY RATING SCALE - C-SSRS
1. IN THE PAST MONTH, HAVE YOU WISHED YOU WERE DEAD OR WISHED YOU COULD GO TO SLEEP AND NOT WAKE UP?: NO
6. HAVE YOU EVER DONE ANYTHING, STARTED TO DO ANYTHING, OR PREPARED TO DO ANYTHING TO END YOUR LIFE?: NO
2. HAVE YOU ACTUALLY HAD ANY THOUGHTS OF KILLING YOURSELF IN THE PAST MONTH?: NO

## 2024-12-08 NOTE — ED PROVIDER NOTES
Emergency Department Note      History of Present Illness   Chief Complaint   Fever and Headache    HPI   Professional  used.   Lucia Beltre is a 64 year old female who presents via EMS for an evaluation of a fever and headache. The patient stated having body aches,cough,  a sore throat, fever and dry mouth that began when she woke up yesterday morning. No vomiting or neck pain or stiffness.    Independent Historian   None    Review of External Notes     Past Medical History   Medical History and Problem List   Dyslipidemia  GERD  Hyperopia with presbyopia  Meibomian gland dysfunction   PTSD    Medications   No current outpatient medications on file.  Physical Exam   Patient Vitals for the past 24 hrs:   BP Temp Temp src Pulse Resp SpO2 Height Weight   12/08/24 0733 120/82 98.1  F (36.7  C) Temporal 109 20 100 % 1.524 m (5') 68.2 kg (150 lb 5.7 oz)     Physical Exam  Vitals and nursing note reviewed.   Constitutional:       Appearance: She is not diaphoretic.   HENT:      Head:      Jaw: No trismus.      Right Ear: Tympanic membrane and external ear normal.      Left Ear: Tympanic membrane, ear canal and external ear normal.      Mouth/Throat:      Mouth: Mucous membranes are moist.      Pharynx: Oropharynx is clear. Posterior oropharyngeal erythema present. No pharyngeal swelling, oropharyngeal exudate or uvula swelling.      Tonsils: Tonsillar exudate present. No tonsillar abscesses. 1+ on the right. 1+ on the left.   Eyes:      General: No scleral icterus.     Extraocular Movements: Extraocular movements intact.      Conjunctiva/sclera: Conjunctivae normal.      Pupils: Pupils are equal, round, and reactive to light.   Cardiovascular:      Rate and Rhythm: Normal rate and regular rhythm.      Heart sounds: No murmur heard.     No friction rub. No gallop.   Pulmonary:      Effort: No respiratory distress.      Breath sounds: No stridor. No wheezing, rhonchi or rales.   Musculoskeletal:      Cervical  back: No rigidity or tenderness.   Lymphadenopathy:      Cervical: No cervical adenopathy.   Skin:     Coloration: Skin is not pale.   Neurological:      Mental Status: She is alert and oriented to person, place, and time. Mental status is at baseline.      Cranial Nerves: No cranial nerve deficit, dysarthria or facial asymmetry.      Sensory: Sensation is intact.      Motor: Motor function is intact.      Coordination: Coordination is intact.   Psychiatric:         Mood and Affect: Mood normal.         Behavior: Behavior normal.         Thought Content: Thought content normal.         Diagnostics   Lab Results   Labs Ordered and Resulted from Time of ED Arrival to Time of ED Departure   GROUP A STREPTOCOCCUS PCR THROAT SWAB - Abnormal       Result Value    Group A strep by PCR Detected (*)    INFLUENZA A/B, RSV AND SARS-COV2 PCR - Normal    Influenza A PCR Negative      Influenza B PCR Negative      RSV PCR Negative      SARS CoV2 PCR Negative       Imaging   None     EKG   None     Independent Interpretation   None  ED Course    Medications Administered   Medications   dexAMETHasone (DECADRON) alcohol-free oral solution 10 mg (has no administration in time range)   acetaminophen (TYLENOL) tablet 1,000 mg (1,000 mg Oral $Given 12/8/24 0736)     Procedures   None      Discussion of Management   None    ED Course   ED Course as of 12/08/24 0915   Sun Dec 08, 2024   0912 I obtained history and examined the patient as noted above.      Additional Documentation  None  Medical Decision Making / Diagnosis   CMS Diagnoses: None    MIPS       None    MDM     This is a very pleasant 64 year old patient who presented with sore throat and clinical evidence of pharyngitis.  The rapid strep test is positive. There is no clinical evidence of  peritonsillar abscess, retropharyngeal abscess, Lemierre's Syndrome, epiglottis, or All's angina.  Low suspicion for meningitis.  The patient's symptoms are consistent with streptococcal  pharyngitis.  I have recommended treatment with antibiotics and analgesics.  Return if increasing pain, change in voice, neck pain, vomiting, fever, or shortness of breath. Follow-up with primary physician if not improving in 3-5 days.      Disposition   The patient was discharged.     Diagnosis     ICD-10-CM    1. Strep pharyngitis  J02.0          Discharge Medications   New Prescriptions    No medications on file     Scribe Disclosure:  I, Tete Dale, am serving as a scribe at 9:13 AM on 12/8/2024 to document services personally performed by Jerzy Nuno PA-C based on my observations and the provider's statements to me.      Jerzy Nuno PA-C  12/08/24 2007       Jerzy Nuno PA-C  12/08/24 2007

## 2024-12-08 NOTE — ED TRIAGE NOTES
"Arrives by EMS from home with c/o headache, tactile fever, generalized body aches, cough and \"Unable to move\" her tongue, reports she woke yesterday with these symptoms. Upon assessment, pt able to stick out her tongue, airway intact. Son arrives during triage and reports pt's tongue has been \"dry.\" Pt denies SOB or chest pain. A&OX4. Instabeat  used.      Triage Assessment (Adult)       Row Name 12/08/24 0733          Triage Assessment    Airway WDL WDL        Respiratory WDL    Respiratory WDL X;cough        Skin Circulation/Temperature WDL    Skin Circulation/Temperature WDL WDL        Cardiac WDL    Cardiac WDL WDL        Peripheral/Neurovascular WDL    Peripheral Neurovascular WDL WDL        Cognitive/Neuro/Behavioral WDL    Cognitive/Neuro/Behavioral WDL WDL                     "

## 2025-01-16 ENCOUNTER — TELEPHONE (OUTPATIENT)
Dept: INTERNAL MEDICINE | Facility: CLINIC | Age: 65
End: 2025-01-16
Payer: COMMERCIAL

## 2025-01-16 ENCOUNTER — PATIENT OUTREACH (OUTPATIENT)
Dept: GERIATRIC MEDICINE | Facility: CLINIC | Age: 65
End: 2025-01-16
Payer: COMMERCIAL

## 2025-01-16 NOTE — PROGRESS NOTES
[facility] Care Coordination Contact    Member became effective with  [employer] on 1/1/2025 with [facility] [employer]  Previous Health Plan:  [facility]  Previous Care System:  [facility]  Previous care coordinators name and number: Carol Singh, [phone]  Waiver Type: CADI  UTF received: Yes: Received and saved to member file  Mailed welcome letter and [facility] When to Contact Your Care Coordinator  Address/Phone discrepancy: No  [place] and fully prepped for visit as [facility].    Arely Ramos  Care Management Specialist  [facility]  [phone]

## 2025-01-16 NOTE — LETTER
January 16, 2025    LUCIA COBB  [address]   [facility] [address]    Dear  Lucia,    Welcome to [facility] s MSC+ health program. My name is YE Cortez. I am your MSC+ care coordinator. You are eligible for Care Coordination through [facility] MSC+ plan.    As your care coordinator, we ll:  Meet to go over your care coordination benefits  Talk about your physical and mental health care needs   Review your preventative care needs  Create a plan that meets your needs with the services you choose    What happens next?  I ll call you soon to introduce myself and tell you more about my role. We ll then plan time to go over your health and safety needs. Our goal is to keep you as healthy and independent as possible.    Soon, you will receive a new MSC+ member identification (ID) card from [facility]. When you receive it, please use this card along with your [place] [facility] card and Prescription Drug Coverage Program card. When you receive, it please use this card where you get your health services. If you have Medicare, you will need to show your Medicare card when you get health services.    The MSC+ care coordination program is voluntary and offered to you at no cost. If you wish to stop being in the care coordination program or have questions, call me at [phone]. If you reach my voicemail, leave a message and your phone number. TTY users, call the [place] [employer]ay at 524 or [phone] (wfkspm-rb-qcwgyo relay service).    Sincerely,      YE Cortez  [phone]  [email or web address]    [id number] accepted   [id number]       [id number] (07/2022)

## 2025-01-17 ENCOUNTER — PATIENT OUTREACH (OUTPATIENT)
Dept: GERIATRIC MEDICINE | Facility: CLINIC | Age: 65
End: 2025-01-17
Payer: COMMERCIAL

## 2025-01-17 NOTE — Clinical Note
Hi Dr. Munguia,  I am Lucia's care coordinator with [facility]. I spoke with Lucia, and overall, she is doing well. She is currently enrolled in the CADI waiver program and receiving services through it. At this time, she has not expressed any additional concerns.  Melyssa Araiza, YE [facility] [phone]

## 2025-01-17 NOTE — PROGRESS NOTES
[facility] Care Coordination Contact      [facility] Refusal Telephone Assessment    Member refused home visit HRA on 01/17/2024 (reason: Lucia states that she is doing well, and mostly independent, she had CADI assessment and have adult day care services started for her. She decline this care coordinator assessment at this time stating that she is traveling to see her daughter at [place], [place] and she will reach out to care coordinator when she is back).    Care coordinator reviewed [facility] assessment done by CADI , Lucia has adult day care services only.     ER visits: Yes -  [facility]  Hospitalizations: No  Health concerns: None  Falls/Injuries: No  ADL/IADL Dependencies:    Lucia had ED visit on 12/08/24 due to a headache and fever. She was not admitted and was prescribed Amoxicillin for strep throat, which was diagnosed. She is currently doing well and recovering at home.        Member currently receiving the following home care services:     Member currently receiving the following community resources:    Informal support(s):      Advanced Care Planning discussion, complete code section.    Health Plan sponsored benefits: [employer] MSC+: Shared information regarding preventative health screening and health plan supplemental benefits/incentives. Reviewed medication disposal form and transition of care member handout.    Follow-Up Plan: Member informed of future contact, plan to f/u with member with a 6 month telephone assessment and offer a home visit.  Contact information shared with member and family, encouraged member to call with any questions or concerns at any time.    This CC note routed to PCP, Nilda Dominguez.    YE Cortez  [facility]  [phone]

## 2025-01-17 NOTE — LETTER
January 21, 2025    LUCIA COBB  [address]   [facility] [address]        Dear Lucia:    As a member of [facility]'s MSC+ program, we offer a health risk assessment at no cost to you. I know you don't want to have the assessment right now. If you change your mind, please call me at the number below.    Who performs the health risk assessment?  A [facility] Care Coordinator performs the assessment. The assessment helps to identify your health and wellness goals. Our care coordinators can also help you understand your benefits, find primary health care providers and arrange transportation for medical care.    Our Care Coordinators are here:  When you need services set up in your home   To help you when your health changes or you're hospitalized  To give you information on staying healthy, preventing falls and immunizations    Questions?  If you have questions, or would like to do the assessment, call me at [phone]. TTY users call [phone].      Sincerely,        YE Cortez  [phone]  [email or web address]          [id number]  [id number] (12/2024)

## 2025-01-21 NOTE — PROGRESS NOTES
"[facility] Care Coordination Contact    Per CC, mailed client a \"Refusal of Home Visit\" letter and uploaded Refusal POC to JENNY Donnelly.    Arely Ramos  Care Management Specialist  [facility]  [phone]         "

## 2025-03-03 ENCOUNTER — PATIENT OUTREACH (OUTPATIENT)
Dept: CARE COORDINATION | Facility: CLINIC | Age: 65
End: 2025-03-03
Payer: COMMERCIAL

## 2025-03-17 ENCOUNTER — PATIENT OUTREACH (OUTPATIENT)
Dept: CARE COORDINATION | Facility: CLINIC | Age: 65
End: 2025-03-17
Payer: COMMERCIAL

## 2025-07-15 ENCOUNTER — PATIENT OUTREACH (OUTPATIENT)
Dept: GERIATRIC MEDICINE | Facility: CLINIC | Age: 65
End: 2025-07-15
Payer: COMMERCIAL

## 2025-07-15 NOTE — PROGRESS NOTES
[facility] Care Coordination Contact      [facility] Six-Month Telephone Assessment    6 month telephone assessment completed on 07/15/2025.    ER visits: Yes -  [facility] [place]  Hospitalizations: No  TCU stays: No  Significant health status changes: None  Falls/Injuries: No  ADL/IADL changes: No  Changes in services: No    Caregiver Assessment follow up:  NA    Goals: See Support Plan for goal progress documentation.  Member has CADI services, lives alone, and her sons and daughter help as needed. and she is reported no other concerns and she is safe at home and her neighborhood.     Will see member in 6 months for an annual health risk assessment.   Encouraged member to call CC with any questions or concerns in the meantime.     YE Cortez  [facility]  [phone]

## 2025-07-17 ENCOUNTER — OFFICE VISIT (OUTPATIENT)
Dept: INTERNAL MEDICINE | Facility: CLINIC | Age: 65
End: 2025-07-17
Payer: COMMERCIAL

## 2025-07-17 ENCOUNTER — LAB (OUTPATIENT)
Dept: LAB | Facility: CLINIC | Age: 65
End: 2025-07-17
Payer: COMMERCIAL

## 2025-07-17 VITALS
RESPIRATION RATE: 18 BRPM | TEMPERATURE: 98.2 F | WEIGHT: 159.1 LBS | DIASTOLIC BLOOD PRESSURE: 78 MMHG | HEART RATE: 98 BPM | HEIGHT: 60 IN | SYSTOLIC BLOOD PRESSURE: 118 MMHG | OXYGEN SATURATION: 97 % | BODY MASS INDEX: 31.24 KG/M2

## 2025-07-17 DIAGNOSIS — K21.9 GASTROESOPHAGEAL REFLUX DISEASE, UNSPECIFIED WHETHER ESOPHAGITIS PRESENT: ICD-10-CM

## 2025-07-17 DIAGNOSIS — R79.89 ELEVATED TSH: ICD-10-CM

## 2025-07-17 DIAGNOSIS — Z78.0 ASYMPTOMATIC POSTMENOPAUSAL STATUS: ICD-10-CM

## 2025-07-17 DIAGNOSIS — G89.29 CHRONIC BILATERAL LOW BACK PAIN WITHOUT SCIATICA: ICD-10-CM

## 2025-07-17 DIAGNOSIS — Z12.11 SCREEN FOR COLON CANCER: ICD-10-CM

## 2025-07-17 DIAGNOSIS — M54.50 CHRONIC BILATERAL LOW BACK PAIN WITHOUT SCIATICA: ICD-10-CM

## 2025-07-17 DIAGNOSIS — Z00.00 ENCOUNTER FOR ANNUAL WELLNESS VISIT (AWV) IN MEDICARE PATIENT: Primary | ICD-10-CM

## 2025-07-17 LAB
ALBUMIN SERPL BCG-MCNC: 4 G/DL (ref 3.5–5.2)
ALP SERPL-CCNC: 91 U/L (ref 40–150)
ALT SERPL W P-5'-P-CCNC: 29 U/L (ref 0–50)
ANION GAP SERPL CALCULATED.3IONS-SCNC: 12 MMOL/L (ref 7–15)
AST SERPL W P-5'-P-CCNC: 28 U/L (ref 0–45)
BILIRUB SERPL-MCNC: 0.2 MG/DL
BUN SERPL-MCNC: 17 MG/DL (ref 8–23)
CALCIUM SERPL-MCNC: 9.6 MG/DL (ref 8.8–10.4)
CHLORIDE SERPL-SCNC: 103 MMOL/L (ref 98–107)
CREAT SERPL-MCNC: 0.66 MG/DL (ref 0.51–0.95)
EGFRCR SERPLBLD CKD-EPI 2021: >90 ML/MIN/1.73M2
ERYTHROCYTE [DISTWIDTH] IN BLOOD BY AUTOMATED COUNT: 14.4 % (ref 10–15)
GLUCOSE SERPL-MCNC: 97 MG/DL (ref 70–99)
HCO3 SERPL-SCNC: 22 MMOL/L (ref 22–29)
HCT VFR BLD AUTO: 42 % (ref 35–47)
HGB BLD-MCNC: 13.8 G/DL (ref 11.7–15.7)
MCH RBC QN AUTO: 27.9 PG (ref 26.5–33)
MCHC RBC AUTO-ENTMCNC: 32.9 G/DL (ref 31.5–36.5)
MCV RBC AUTO: 85 FL (ref 78–100)
PLATELET # BLD AUTO: 277 10E3/UL (ref 150–450)
POTASSIUM SERPL-SCNC: 3.9 MMOL/L (ref 3.4–5.3)
PROT SERPL-MCNC: 7.3 G/DL (ref 6.4–8.3)
RBC # BLD AUTO: 4.95 10E6/UL (ref 3.8–5.2)
SODIUM SERPL-SCNC: 137 MMOL/L (ref 135–145)
TSH SERPL DL<=0.005 MIU/L-ACNC: 3.9 UIU/ML (ref 0.3–4.2)
WBC # BLD AUTO: 7.9 10E3/UL (ref 4–11)

## 2025-07-17 PROCEDURE — 85014 HEMATOCRIT: CPT

## 2025-07-17 PROCEDURE — 82310 ASSAY OF CALCIUM: CPT

## 2025-07-17 PROCEDURE — 84443 ASSAY THYROID STIM HORMONE: CPT

## 2025-07-17 PROCEDURE — 36415 COLL VENOUS BLD VENIPUNCTURE: CPT

## 2025-07-17 RX ORDER — OMEPRAZOLE 20 MG/1
20 CAPSULE, DELAYED RELEASE ORAL 2 TIMES DAILY
Qty: 180 CAPSULE | Refills: 1 | Status: SHIPPED | OUTPATIENT
Start: 2025-07-17

## 2025-07-17 RX ORDER — ACETAMINOPHEN 500 MG
500-1000 TABLET ORAL EVERY 6 HOURS PRN
Qty: 90 TABLET | Refills: 1 | Status: SHIPPED | OUTPATIENT
Start: 2025-07-17

## 2025-07-17 SDOH — HEALTH STABILITY: PHYSICAL HEALTH: ON AVERAGE, HOW MANY DAYS PER WEEK DO YOU ENGAGE IN MODERATE TO STRENUOUS EXERCISE (LIKE A BRISK WALK)?: 3 DAYS

## 2025-07-17 ASSESSMENT — SOCIAL DETERMINANTS OF HEALTH (SDOH): HOW OFTEN DO YOU GET TOGETHER WITH FRIENDS OR RELATIVES?: ONCE A WEEK

## 2025-07-17 NOTE — PROGRESS NOTES
Preventive Care Visit  [facility]  Nilda Dominguez MD, Internal Medicine  Jul 17, 2025      Assessment & Plan     Encounter for annual wellness visit (AWV) in Medicare patient  Fasting lab work ordered.  As patient is not fasting today, patient will make a lab only appointment.  Declines mammogram.    Screen for colon cancer  Would like to proceed with stool test only for colon cancer screening.  - Fecal colorectal cancer screen FIT - Future (S+30); Future    Asymptomatic postmenopausal status  - DEXA HIP/PELVIS/SPINE - Future; Future    Elevated TSH  - TSH with free T4 reflex; Future    Gastroesophageal reflux disease, unspecified whether esophagitis present  Patient endorses having symptoms despite taking omeprazole at 20 mg daily.  Increase medication to 20 mg twice daily.  Follow-up in clinic around 6 weeks and if symptoms are still persisting, can consider proceeding with endoscopy referral.  This was discussed with the patient today.  Update lab work as below.  - Comprehensive metabolic panel; Future  - CBC with platelets; Future  - omeprazole (PRILOSEC) 20 MG DR capsule; Take 1 capsule (20 mg) by mouth 2 times daily.    Chronic bilateral low back pain without sciatica  This has been a chronic concern for the patient.  She has not been using any pain medications and with ongoing GERD symptoms, would like to hold off on any NSAID medications.  We discussed a trial of Tylenol medication to be used at 1-2 tabs every 6-8 hours as needed for pain.  Patient would also like to proceed with physical therapy referral, referral placed.  Patient requesting for spine clinic referral to discuss further treatment options.  - Spine  Referral; Future  - acetaminophen (TYLENOL) 500 MG tablet; Take 1-2 tablets (500-1,000 mg) by mouth every 6 hours as needed for mild pain.  - Physical Therapy  Referral; Future    Patient has been advised of split billing requirements and indicates  understanding: Yes    BMI  Estimated body mass index is 31.07 kg/m  as calculated from the following:    Height as of this encounter: 1.524 m (5').    Weight as of this encounter: 72.2 kg (159 lb 1.6 oz).       Counseling  Appropriate preventive services were addressed with this patient via screening, questionnaire, or discussion as appropriate for fall prevention, nutrition, physical activity, Tobacco-use cessation, social engagement, weight loss and cognition.    Julio Myers is a 65 year old, presenting for the following:  Medicare Visit (Back pain and acid reflex concern )        7/17/2025     2:27 PM   Additional Questions   Roomed by Ryan   Accompanied by Son           HPI  Due to language barrier, an  on the ipad was present during the history-taking and subsequent discussion (and for part of the physical exam) with this patient.        Advance Care Planning    Discussed advance care planning with patient; however, patient declined at this time.        7/17/2025   General Health   How would you rate your overall physical health? (!) POOR         7/17/2025   Nutrition   Diet: Regular (no restrictions)    Vegetarian/vegan    Breakfast skipped    Other    I don't know       Multiple values from one day are sorted in reverse-chronological order         4/2/2024   Exercise   Days per week of moderate/strenous exercise 3 days         4/2/2024   Social Factors   Frequency of gathering with friends or relatives Twice a week   Worry food won't last until get money to buy more No   Food not last or not have enough money for food? No   Do you have housing? (Housing is defined as stable permanent housing and does not include staying outside in a car, in a tent, in an abandoned building, in an overnight shelter, or couch-surfing.) No   Are you worried about losing your housing? No   Lack of transportation? No   Unable to get utilities (heat,electricity)? No   Want help with housing or utility concern? No           No data to display                  4/2/2024   Dental   Dentist two times every year? (!) NO          No data to display                     No data to display                    Today's PHQ-2 Score:       7/17/2025     2:35 PM   PHQ-2 ( 1999 [employer])   Q1: Little interest or pleasure in doing things 0   Q2: Feeling down, depressed or hopeless 0   PHQ-2 Score 0    Q1: Little interest or pleasure in doing things Not at all   Q2: Feeling down, depressed or hopeless Not at all   PHQ-2 Score 0       Patient-reported         4/2/2024   Substance Use   Alcohol more than 3/day or more than 7/wk No   Do you use any other substances recreationally? No     Social History     Tobacco Use    Smoking status: Never     Passive exposure: Never    Smokeless tobacco: Never   Vaping Use    Vaping status: Never Used   Substance Use Topics    Alcohol use: Never    Drug use: Never          Mammogram Screening - Mammography discussed and declined           ASCVD Risk   The 10-year ASCVD risk score (Florence DK, et al., 2019) is: 4.9%    Values used to calculate the score:      Age: 65 years      Sex: Female      Is Non- : No      Diabetic: No      Tobacco smoker: No      Systolic Blood Pressure: 118 mmHg      Is BP treated: No      HDL Cholesterol: 55 mg/dL      Total Cholesterol: 211 mg/dL            Reviewed and updated as needed this visit by Provider                      Current providers sharing in care for this patient include:  Patient Care Team:  Nilda Dominguez MD as PCP - General (Internal Medicine)  Nilda Dominguez MD as Assigned PCP  Nilda Dominguez MD (Internal Medicine)  Melyssa Araiza, RN as Lead Care Coordinator (Primary Care - CC)    The following health maintenance items are reviewed in Epic and correct as of today:  Health Maintenance   Topic Date Due    DEXA  Never done    COVID-19 VACCINE (1 - 2024-25 season) Never done    ANNUAL REVIEW OF HM ORDERS  02/01/2025    COLORECTAL  CANCER SCREENING  04/07/2025    ZOSTER VACCINE (2 of 2) 07/04/2025    INFLUENZA VACCINE (1) 09/01/2025    MEDICARE ANNUAL WELLNESS VISIT  05/09/2026    FALL RISK ASSESSMENT  07/17/2026    DIABETES SCREENING  02/01/2027    DTAP/TDAP/TD VACCINE (2 - Td or Tdap) 12/12/2028    LIPID  04/02/2029    ADVANCE CARE PLANNING  07/17/2030    RSV VACCINE (1 - 1-dose 75+ series) 01/01/2035    HEPATITIS C SCREENING  Completed    HIV SCREENING  Completed    PHQ-2 (once per calendar year)  Completed    PNEUMOCOCCAL VACCINE 50+ YEARS  Completed    HPV VACCINE  Aged Out    MENINGITIS VACCINE  Aged Out    MAMMO SCREENING  Discontinued         Review of Systems  Constitutional, HEENT, cardiovascular, pulmonary, gi and gu systems are negative, except as otherwise noted.     Objective    Exam  /78 (BP Location: Right arm, Patient Position: Sitting, Cuff Size: Adult Regular)   Pulse 98   Temp 98.2  F (36.8  C) (Tympanic)   Resp 18   Ht 1.524 m (5')   Wt 72.2 kg (159 lb 1.6 oz)   LMP  (LMP Unknown)   SpO2 97%   BMI 31.07 kg/m     Estimated body mass index is 31.07 kg/m  as calculated from the following:    Height as of this encounter: 1.524 m (5').    Weight as of this encounter: 72.2 kg (159 lb 1.6 oz).    Physical Exam  GENERAL: alert and no distress  RESP: lungs clear to auscultation - no rales, rhonchi or wheezes  CV: regular rate and rhythm, normal S1 S2  MS: no gross musculoskeletal defects noted, no edema  NEURO: Normal strength and tone, mentation intact and speech normal  PSYCH: mentation appears normal, affect normal.        7/17/2025   Mini Cog   Mini-Cog Not Completed (choose reason) Language barrier and no  present             Signed Electronically by: Nilda Dominguez MD

## 2025-07-21 ENCOUNTER — PATIENT OUTREACH (OUTPATIENT)
Dept: CARE COORDINATION | Facility: CLINIC | Age: 65
End: 2025-07-21
Payer: COMMERCIAL

## 2025-07-31 ENCOUNTER — ORDERS ONLY (AUTO-RELEASED) (OUTPATIENT)
Dept: INTERNAL MEDICINE | Facility: CLINIC | Age: 65
End: 2025-07-31
Payer: COMMERCIAL

## 2025-08-12 ENCOUNTER — TELEPHONE (OUTPATIENT)
Dept: INTERNAL MEDICINE | Facility: CLINIC | Age: 65
End: 2025-08-12
Payer: COMMERCIAL

## 2025-08-14 ENCOUNTER — APPOINTMENT (OUTPATIENT)
Dept: INTERPRETER SERVICES | Facility: CLINIC | Age: 65
End: 2025-08-14
Payer: COMMERCIAL

## 2025-08-21 ENCOUNTER — PRE VISIT (OUTPATIENT)
Dept: NEUROSURGERY | Facility: CLINIC | Age: 65
End: 2025-08-21
Payer: COMMERCIAL

## 2025-09-04 ENCOUNTER — THERAPY VISIT (OUTPATIENT)
Dept: PHYSICAL THERAPY | Facility: CLINIC | Age: 65
End: 2025-09-04
Attending: INTERNAL MEDICINE
Payer: COMMERCIAL

## 2025-09-04 DIAGNOSIS — G89.29 CHRONIC BILATERAL LOW BACK PAIN WITHOUT SCIATICA: ICD-10-CM

## 2025-09-04 DIAGNOSIS — M54.50 CHRONIC BILATERAL LOW BACK PAIN WITHOUT SCIATICA: ICD-10-CM
